# Patient Record
Sex: FEMALE | Race: WHITE | NOT HISPANIC OR LATINO | Employment: OTHER
[De-identification: names, ages, dates, MRNs, and addresses within clinical notes are randomized per-mention and may not be internally consistent; named-entity substitution may affect disease eponyms.]

---

## 2017-12-18 ENCOUNTER — HOSPITAL (OUTPATIENT)
Dept: OTHER | Age: 62
End: 2017-12-18
Attending: EMERGENCY MEDICINE

## 2017-12-18 LAB
ALBUMIN SERPL-MCNC: 3.4 GM/DL (ref 3.6–5.1)
ALBUMIN/GLOB SERPL: 0.6 {RATIO} (ref 1–2.4)
ALP SERPL-CCNC: 228 UNIT/L (ref 45–117)
ALT SERPL-CCNC: 41 UNIT/L
AMORPH SED URNS QL MICRO: ABNORMAL
ANALYZER ANC (IANC): ABNORMAL
ANION GAP SERPL CALC-SCNC: 14 MMOL/L (ref 10–20)
APPEARANCE UR: CLEAR
AST SERPL-CCNC: 33 UNIT/L
BACTERIA #/AREA URNS HPF: ABNORMAL /HPF
BASOPHILS # BLD: 0 THOUSAND/MCL (ref 0–0.3)
BASOPHILS NFR BLD: 0 %
BILIRUB SERPL-MCNC: 0.8 MG/DL (ref 0.2–1)
BILIRUB UR QL: NEGATIVE
BUN SERPL-MCNC: 18 MG/DL (ref 6–20)
BUN/CREAT SERPL: 14 (ref 7–25)
CALCIUM SERPL-MCNC: 8.9 MG/DL (ref 8.4–10.2)
CAOX CRY URNS QL MICRO: ABNORMAL
CHLORIDE: 107 MMOL/L (ref 98–107)
CO2 SERPL-SCNC: 27 MMOL/L (ref 21–32)
COLOR UR: YELLOW
CREAT SERPL-MCNC: 1.32 MG/DL (ref 0.51–0.95)
DIFFERENTIAL METHOD BLD: ABNORMAL
EOSINOPHIL # BLD: 0.2 THOUSAND/MCL (ref 0.1–0.5)
EOSINOPHIL NFR BLD: 2 %
EPITH CASTS #/AREA URNS LPF: ABNORMAL /[LPF]
ERYTHROCYTE [DISTWIDTH] IN BLOOD: 15.4 % (ref 11–15)
FATTY CASTS #/AREA URNS LPF: ABNORMAL /[LPF]
GLOBULIN SER-MCNC: 5.3 GM/DL (ref 2–4)
GLUCOSE SERPL-MCNC: 89 MG/DL (ref 65–99)
GLUCOSE UR-MCNC: NEGATIVE MG/DL
GRAN CASTS #/AREA URNS LPF: ABNORMAL /[LPF]
HEMATOCRIT: 45.6 % (ref 36–46.5)
HGB BLD-MCNC: 14.9 GM/DL (ref 12–15.5)
HGB UR QL: NEGATIVE
HYALINE CASTS #/AREA URNS LPF: ABNORMAL /LPF (ref 0–5)
KETONES UR-MCNC: ABNORMAL MG/DL
LEUKOCYTE ESTERASE UR QL STRIP: NEGATIVE
LIPASE SERPL-CCNC: 70 UNIT/L (ref 73–393)
LYMPHOCYTES # BLD: 3.3 THOUSAND/MCL (ref 1–4)
LYMPHOCYTES NFR BLD: 27 %
MAGNESIUM SERPL-MCNC: 1.9 MG/DL (ref 1.7–2.4)
MCH RBC QN AUTO: 28.5 PG (ref 26–34)
MCHC RBC AUTO-ENTMCNC: 32.7 GM/DL (ref 32–36.5)
MCV RBC AUTO: 87.4 FL (ref 78–100)
MIXED CELL CASTS #/AREA URNS LPF: ABNORMAL /[LPF]
MONOCYTES # BLD: 0.8 THOUSAND/MCL (ref 0.3–0.9)
MONOCYTES NFR BLD: 6 %
MUCOUS THREADS URNS QL MICRO: ABNORMAL
NEUTROPHILS # BLD: 8 THOUSAND/MCL (ref 1.8–7.7)
NEUTROPHILS NFR BLD: 65 %
NEUTS SEG NFR BLD: ABNORMAL %
NITRITE UR QL: NEGATIVE
PERCENT NRBC: ABNORMAL
PH UR: 7 UNIT (ref 5–7)
PLATELET # BLD: 286 THOUSAND/MCL (ref 140–450)
POTASSIUM SERPL-SCNC: 3.2 MMOL/L (ref 3.4–5.1)
PROT SERPL-MCNC: 8.7 GM/DL (ref 6.4–8.2)
PROT UR QL: NEGATIVE MG/DL
RBC # BLD: 5.22 MILLION/MCL (ref 4–5.2)
RBC #/AREA URNS HPF: ABNORMAL /HPF (ref 0–3)
RBC CASTS #/AREA URNS LPF: ABNORMAL /[LPF]
RENAL EPI CELLS #/AREA URNS HPF: ABNORMAL /[HPF]
SODIUM SERPL-SCNC: 145 MMOL/L (ref 135–145)
SP GR UR: 1.02 (ref 1–1.03)
SPECIMEN SOURCE: ABNORMAL
SPERM URNS QL MICRO: ABNORMAL
SQUAMOUS #/AREA URNS HPF: ABNORMAL /HPF (ref 0–5)
T VAGINALIS URNS QL MICRO: ABNORMAL
TRI-PHOS CRY URNS QL MICRO: ABNORMAL
URATE CRY URNS QL MICRO: ABNORMAL
URINE REFLEX: ABNORMAL
URNS CMNT MICRO: ABNORMAL
UROBILINOGEN UR QL: 2 MG/DL (ref 0–1)
WAXY CASTS #/AREA URNS LPF: ABNORMAL /[LPF]
WBC # BLD: 12.3 THOUSAND/MCL (ref 4.2–11)
WBC #/AREA URNS HPF: ABNORMAL /HPF (ref 0–5)
WBC CASTS #/AREA URNS LPF: ABNORMAL /[LPF]
YEAST HYPHAE URNS QL MICRO: ABNORMAL
YEAST URNS QL MICRO: ABNORMAL

## 2018-02-12 ENCOUNTER — HOSPITAL (OUTPATIENT)
Dept: OTHER | Age: 63
End: 2018-02-12
Attending: SPECIALIST

## 2018-02-15 ENCOUNTER — HOSPITAL (OUTPATIENT)
Dept: OTHER | Age: 63
End: 2018-02-15
Attending: SPECIALIST

## 2018-03-29 ENCOUNTER — HOSPITAL (OUTPATIENT)
Dept: OTHER | Age: 63
End: 2018-03-29
Attending: SPECIALIST

## 2018-03-29 ENCOUNTER — DIAGNOSTIC TRANS (OUTPATIENT)
Dept: OTHER | Age: 63
End: 2018-03-29

## 2018-03-29 LAB
ANALYZER ANC (IANC): ABNORMAL
ANION GAP SERPL CALC-SCNC: 12 MMOL/L (ref 10–20)
APTT PPP: 26 SECONDS (ref 22–30)
APTT PPP: 35 SECONDS (ref 22–30)
APTT PPP: ABNORMAL S
APTT PPP: NORMAL S
BASOPHILS # BLD: 0 THOUSAND/MCL (ref 0–0.3)
BASOPHILS NFR BLD: 0 %
BNP SERPL-MCNC: 22 PG/ML
BUN SERPL-MCNC: 36 MG/DL (ref 6–20)
BUN/CREAT SERPL: 24 (ref 7–25)
CALCIUM SERPL-MCNC: 9.1 MG/DL (ref 8.4–10.2)
CHLORIDE: 103 MMOL/L (ref 98–107)
CO2 SERPL-SCNC: 31 MMOL/L (ref 21–32)
CREAT SERPL-MCNC: 1.5 MG/DL (ref 0.51–0.95)
DIFFERENTIAL METHOD BLD: ABNORMAL
EOSINOPHIL # BLD: 0.5 THOUSAND/MCL (ref 0.1–0.5)
EOSINOPHIL NFR BLD: 4 %
ERYTHROCYTE [DISTWIDTH] IN BLOOD: 16.6 % (ref 11–15)
FREE T4: 1.2 NG/DL (ref 0.8–1.5)
GLUCOSE BLDC GLUCOMTR-MCNC: 180 MG/DL (ref 65–99)
GLUCOSE BLDC GLUCOMTR-MCNC: 185 MG/DL (ref 65–99)
GLUCOSE BLDC GLUCOMTR-MCNC: 248 MG/DL (ref 65–99)
GLUCOSE BLDC GLUCOMTR-MCNC: 55 MG/DL (ref 65–99)
GLUCOSE SERPL-MCNC: 93 MG/DL (ref 65–99)
HEMATOCRIT: 49.6 % (ref 36–46.5)
HGB BLD-MCNC: 16.4 GM/DL (ref 12–15.5)
INR PPP: 1
LYMPHOCYTES # BLD: 3.1 THOUSAND/MCL (ref 1–4)
LYMPHOCYTES NFR BLD: 25 %
MAGNESIUM SERPL-MCNC: 2 MG/DL (ref 1.7–2.4)
MCH RBC QN AUTO: 28.8 PG (ref 26–34)
MCHC RBC AUTO-ENTMCNC: 33.1 GM/DL (ref 32–36.5)
MCV RBC AUTO: 87 FL (ref 78–100)
MONOCYTES # BLD: 0.9 THOUSAND/MCL (ref 0.3–0.9)
MONOCYTES NFR BLD: 8 %
NEUTROPHILS # BLD: 7.7 THOUSAND/MCL (ref 1.8–7.7)
NEUTROPHILS NFR BLD: 63 %
NEUTS SEG NFR BLD: ABNORMAL %
PERCENT NRBC: ABNORMAL
PLATELET # BLD: 299 THOUSAND/MCL (ref 140–450)
POTASSIUM SERPL-SCNC: 3.5 MMOL/L (ref 3.4–5.1)
PROTHROMBIN TIME: 10.3 SECONDS (ref 9.7–11.8)
PROTHROMBIN TIME: NORMAL
RBC # BLD: 5.7 MILLION/MCL (ref 4–5.2)
SODIUM SERPL-SCNC: 142 MMOL/L (ref 135–145)
TROPONIN I SERPL HS-MCNC: <0.02 NG/ML
TSH SERPL-ACNC: 7.75 MCUNIT/ML (ref 0.35–5)
WBC # BLD: 12.2 THOUSAND/MCL (ref 4.2–11)

## 2018-03-30 ENCOUNTER — PRIOR ORIGINAL RECORDS (OUTPATIENT)
Dept: OTHER | Age: 63
End: 2018-03-30

## 2018-03-30 ENCOUNTER — CHARTING TRANS (OUTPATIENT)
Dept: OTHER | Age: 63
End: 2018-03-30

## 2018-03-30 LAB
ALBUMIN SERPL-MCNC: 3 GM/DL (ref 3.6–5.1)
ALBUMIN/GLOB SERPL: 0.6 {RATIO} (ref 1–2.4)
ALP SERPL-CCNC: 175 UNIT/L (ref 45–117)
ALT SERPL-CCNC: 40 UNIT/L
ANALYZER ANC (IANC): ABNORMAL
ANION GAP SERPL CALC-SCNC: 11 MMOL/L (ref 10–20)
APTT PPP: 37 SECONDS (ref 22–30)
APTT PPP: 48 SECONDS (ref 22–30)
APTT PPP: ABNORMAL S
APTT PPP: ABNORMAL S
AST SERPL-CCNC: 33 UNIT/L
BILIRUB SERPL-MCNC: 0.6 MG/DL (ref 0.2–1)
BUN SERPL-MCNC: 33 MG/DL (ref 6–20)
BUN/CREAT SERPL: 23 (ref 7–25)
CALCIUM SERPL-MCNC: 8.7 MG/DL (ref 8.4–10.2)
CHLORIDE: 104 MMOL/L (ref 98–107)
CHOLEST SERPL-MCNC: 135 MG/DL
CHOLEST/HDLC SERPL: 3.2 {RATIO}
CO2 SERPL-SCNC: 29 MMOL/L (ref 21–32)
CREAT SERPL-MCNC: 1.42 MG/DL (ref 0.51–0.95)
ERYTHROCYTE [DISTWIDTH] IN BLOOD: 16.5 % (ref 11–15)
GLOBULIN SER-MCNC: 5 GM/DL (ref 2–4)
GLUCOSE BLDC GLUCOMTR-MCNC: 145 MG/DL (ref 65–99)
GLUCOSE BLDC GLUCOMTR-MCNC: 198 MG/DL (ref 65–99)
GLUCOSE BLDC GLUCOMTR-MCNC: 92 MG/DL (ref 65–99)
GLUCOSE SERPL-MCNC: 118 MG/DL (ref 65–99)
GLYCOHEMOGLOBIN: 7.9 % (ref 4.5–5.6)
HDLC SERPL-MCNC: 42 MG/DL
HEMATOCRIT: 46.8 % (ref 36–46.5)
HGB BLD-MCNC: 15.1 GM/DL (ref 12–15.5)
LDLC SERPL CALC-MCNC: 74 MG/DL
MCH RBC QN AUTO: 27.9 PG (ref 26–34)
MCHC RBC AUTO-ENTMCNC: 32.3 GM/DL (ref 32–36.5)
MCV RBC AUTO: 86.5 FL (ref 78–100)
NONHDLC SERPL-MCNC: 93 MG/DL
PERCENT NRBC: ABNORMAL
PLATELET # BLD: 258 THOUSAND/MCL (ref 140–450)
POTASSIUM SERPL-SCNC: 3.8 MMOL/L (ref 3.4–5.1)
PROT SERPL-MCNC: 8 GM/DL (ref 6.4–8.2)
RBC # BLD: 5.41 MILLION/MCL (ref 4–5.2)
SODIUM SERPL-SCNC: 140 MMOL/L (ref 135–145)
TRIGLYCERIDE (TRIGP): 95 MG/DL
WBC # BLD: 10.4 THOUSAND/MCL (ref 4.2–11)

## 2018-04-03 ENCOUNTER — CHARTING TRANS (OUTPATIENT)
Dept: OTHER | Age: 63
End: 2018-04-03

## 2018-04-03 ENCOUNTER — PRIOR ORIGINAL RECORDS (OUTPATIENT)
Dept: OTHER | Age: 63
End: 2018-04-03

## 2018-04-03 ENCOUNTER — HOSPITAL (OUTPATIENT)
Dept: OTHER | Age: 63
End: 2018-04-03
Attending: SPECIALIST

## 2018-04-03 LAB
ANALYZER ANC (IANC): ABNORMAL
ANION GAP SERPL CALC-SCNC: 14 MMOL/L (ref 10–20)
BASOPHILS # BLD: 0.1 THOUSAND/MCL (ref 0–0.3)
BASOPHILS NFR BLD: 0 %
BUN SERPL-MCNC: 41 MG/DL (ref 6–20)
BUN/CREAT SERPL: 27 (ref 7–25)
CALCIUM SERPL-MCNC: 9 MG/DL (ref 8.4–10.2)
CHLORIDE: 106 MMOL/L (ref 98–107)
CO2 SERPL-SCNC: 27 MMOL/L (ref 21–32)
CREAT SERPL-MCNC: 1.52 MG/DL (ref 0.51–0.95)
DIFFERENTIAL METHOD BLD: ABNORMAL
EOSINOPHIL # BLD: 0.3 THOUSAND/MCL (ref 0.1–0.5)
EOSINOPHIL NFR BLD: 2 %
ERYTHROCYTE [DISTWIDTH] IN BLOOD: 16.9 % (ref 11–15)
GLUCOSE BLDC GLUCOMTR-MCNC: 68 MG/DL (ref 65–99)
GLUCOSE BLDC GLUCOMTR-MCNC: 69 MG/DL (ref 65–99)
GLUCOSE BLDC GLUCOMTR-MCNC: 76 MG/DL (ref 65–99)
GLUCOSE BLDC GLUCOMTR-MCNC: 83 MG/DL (ref 65–99)
GLUCOSE SERPL-MCNC: 76 MG/DL (ref 65–99)
HEMATOCRIT: 48 % (ref 36–46.5)
HGB BLD-MCNC: 15.7 GM/DL (ref 12–15.5)
LYMPHOCYTES # BLD: 2.3 THOUSAND/MCL (ref 1–4)
LYMPHOCYTES NFR BLD: 17 %
MAGNESIUM SERPL-MCNC: 2.1 MG/DL (ref 1.7–2.4)
MCH RBC QN AUTO: 28.7 PG (ref 26–34)
MCHC RBC AUTO-ENTMCNC: 32.7 GM/DL (ref 32–36.5)
MCV RBC AUTO: 87.8 FL (ref 78–100)
MONOCYTES # BLD: 1.1 THOUSAND/MCL (ref 0.3–0.9)
MONOCYTES NFR BLD: 8 %
NEUTROPHILS # BLD: 9.3 THOUSAND/MCL (ref 1.8–7.7)
NEUTROPHILS NFR BLD: 73 %
NEUTS SEG NFR BLD: ABNORMAL %
PERCENT NRBC: ABNORMAL
PLATELET # BLD: 248 THOUSAND/MCL (ref 140–450)
POTASSIUM SERPL-SCNC: 3.8 MMOL/L (ref 3.4–5.1)
RBC # BLD: 5.47 MILLION/MCL (ref 4–5.2)
SODIUM SERPL-SCNC: 143 MMOL/L (ref 135–145)
WBC # BLD: 13 THOUSAND/MCL (ref 4.2–11)

## 2018-04-04 PROBLEM — R06.83 SNORING: Status: ACTIVE | Noted: 2018-04-04

## 2018-04-04 PROBLEM — I48.91 ATRIAL FIBRILLATION, UNSPECIFIED TYPE (HCC): Status: ACTIVE | Noted: 2018-04-04

## 2018-04-04 LAB
GLUCOSE BLDC GLUCOMTR-MCNC: 118 MG/DL (ref 65–99)
GLUCOSE BLDC GLUCOMTR-MCNC: 120 MG/DL (ref 65–99)
GLUCOSE BLDC GLUCOMTR-MCNC: 85 MG/DL (ref 65–99)

## 2018-04-12 ENCOUNTER — HOSPITAL (OUTPATIENT)
Dept: OTHER | Age: 63
End: 2018-04-12
Attending: SPECIALIST

## 2018-04-30 ENCOUNTER — HOSPITAL (OUTPATIENT)
Dept: OTHER | Age: 63
End: 2018-04-30
Attending: SPECIALIST

## 2018-05-02 ENCOUNTER — HOSPITAL (OUTPATIENT)
Dept: OTHER | Age: 63
End: 2018-05-02
Attending: SPECIALIST

## 2018-06-07 ENCOUNTER — HOSPITAL (OUTPATIENT)
Dept: OTHER | Age: 63
End: 2018-06-07
Attending: SPECIALIST

## 2018-06-13 ENCOUNTER — MYAURORA ACCOUNT LINK (OUTPATIENT)
Dept: OTHER | Age: 63
End: 2018-06-13

## 2018-06-13 ENCOUNTER — PRIOR ORIGINAL RECORDS (OUTPATIENT)
Dept: OTHER | Age: 63
End: 2018-06-13

## 2018-06-13 LAB
BUN: 41 MG/DL
CALCIUM: 9 MG/DL
CHLORIDE: 106 MEQ/L
CHOLESTEROL, TOTAL: 135 MG/DL
CREATININE, SERUM: 1.52 MG/DL
GLUCOSE: 76 MG/DL
HDL CHOLESTEROL: 42 MG/DL
LDL CHOLESTEROL: 74 MG/DL
NON-HDL CHOLESTEROL: 93 MG/DL
POTASSIUM, SERUM: 3.8 MEQ/L
SODIUM: 143 MEQ/L
TRIGLYCERIDES: 95 MG/DL

## 2018-08-07 ENCOUNTER — HOSPITAL (OUTPATIENT)
Dept: OTHER | Age: 63
End: 2018-08-07
Attending: INTERNAL MEDICINE

## 2018-08-10 ENCOUNTER — HOSPITAL (OUTPATIENT)
Dept: OTHER | Age: 63
End: 2018-08-10
Attending: SPECIALIST

## 2018-11-01 ENCOUNTER — HOSPITAL (OUTPATIENT)
Dept: OTHER | Age: 63
End: 2018-11-01
Attending: SPECIALIST

## 2019-02-28 VITALS
WEIGHT: 293 LBS | HEART RATE: 63 BPM | BODY MASS INDEX: 48.82 KG/M2 | HEIGHT: 65 IN | SYSTOLIC BLOOD PRESSURE: 108 MMHG | DIASTOLIC BLOOD PRESSURE: 64 MMHG

## 2019-11-23 ENCOUNTER — HOSPITAL ENCOUNTER (OUTPATIENT)
Dept: GENERAL RADIOLOGY | Facility: HOSPITAL | Age: 64
Discharge: HOME | End: 2019-11-23
Attending: SPECIALIST
Payer: MEDICARE

## 2019-11-23 DIAGNOSIS — M54.16 LUMBAR RADICULOPATHY: ICD-10-CM

## 2019-11-23 PROCEDURE — 72110 X-RAY EXAM L-2 SPINE 4/>VWS: CPT | Performed by: SPECIALIST

## 2019-11-27 ENCOUNTER — HOSPITAL ENCOUNTER (OUTPATIENT)
Dept: BONE DENSITY | Age: 64
Discharge: HOME | End: 2019-11-27
Attending: SPECIALIST
Payer: MEDICARE

## 2019-11-27 ENCOUNTER — HOSPITAL ENCOUNTER (OUTPATIENT)
Dept: MAMMOGRAPHY | Age: 64
Discharge: HOME | End: 2019-11-27
Attending: SPECIALIST
Payer: MEDICARE

## 2019-11-27 DIAGNOSIS — M54.16 LUMBAR RADICULOPATHY: ICD-10-CM

## 2019-11-27 DIAGNOSIS — Z12.31 ENCOUNTER FOR SCREENING MAMMOGRAM FOR MALIGNANT NEOPLASM OF BREAST: ICD-10-CM

## 2019-11-27 DIAGNOSIS — M81.0 OSTEOPOROSIS: ICD-10-CM

## 2019-11-27 PROCEDURE — 77067 SCR MAMMO BI INCL CAD: CPT | Performed by: SPECIALIST

## 2019-11-27 PROCEDURE — 77080 DXA BONE DENSITY AXIAL: CPT | Performed by: SPECIALIST

## 2019-11-27 PROCEDURE — 77063 BREAST TOMOSYNTHESIS BI: CPT | Performed by: SPECIALIST

## 2019-12-12 ENCOUNTER — HOSPITAL ENCOUNTER (INPATIENT)
Facility: HOSPITAL | Age: 64
LOS: 2 days | Discharge: HOME | DRG: 603 | End: 2019-12-15
Attending: EMERGENCY MEDICINE | Admitting: INTERNAL MEDICINE
Payer: MEDICARE

## 2019-12-12 ENCOUNTER — APPOINTMENT (OUTPATIENT)
Dept: GENERAL RADIOLOGY | Facility: HOSPITAL | Age: 64
DRG: 603 | End: 2019-12-12
Attending: EMERGENCY MEDICINE
Payer: MEDICARE

## 2019-12-12 DIAGNOSIS — N28.9 RENAL INSUFFICIENCY: ICD-10-CM

## 2019-12-12 DIAGNOSIS — Z79.4 TYPE 2 DIABETES MELLITUS WITH HYPERGLYCEMIA, WITH LONG-TERM CURRENT USE OF INSULIN (HCC): ICD-10-CM

## 2019-12-12 DIAGNOSIS — L02.612 ABSCESS OR CELLULITIS OF TOE, LEFT: Primary | ICD-10-CM

## 2019-12-12 DIAGNOSIS — E87.6 HYPOKALEMIA: ICD-10-CM

## 2019-12-12 DIAGNOSIS — L03.032 ABSCESS OR CELLULITIS OF TOE, LEFT: Primary | ICD-10-CM

## 2019-12-12 DIAGNOSIS — E11.65 TYPE 2 DIABETES MELLITUS WITH HYPERGLYCEMIA, WITH LONG-TERM CURRENT USE OF INSULIN (HCC): ICD-10-CM

## 2019-12-12 LAB
ALBUMIN SERPL-MCNC: 3 G/DL (ref 3.4–5)
ALBUMIN/GLOB SERPL: 0.6 {RATIO} (ref 1–2)
ALP LIVER SERPL-CCNC: 201 U/L (ref 50–130)
ALT SERPL-CCNC: 52 U/L (ref 13–56)
ANION GAP SERPL CALC-SCNC: 10 MMOL/L (ref 0–18)
AST SERPL-CCNC: 35 U/L (ref 15–37)
BASOPHILS # BLD AUTO: 0.09 X10(3) UL (ref 0–0.2)
BASOPHILS NFR BLD AUTO: 0.8 %
BILIRUB SERPL-MCNC: 0.3 MG/DL (ref 0.1–2)
BILIRUB UR QL STRIP.AUTO: NEGATIVE
BUN BLD-MCNC: 84 MG/DL (ref 7–18)
BUN/CREAT SERPL: 26.8 (ref 10–20)
CALCIUM BLD-MCNC: 7.7 MG/DL (ref 8.5–10.1)
CHLORIDE SERPL-SCNC: 103 MMOL/L (ref 98–112)
CLARITY UR REFRACT.AUTO: CLEAR
CO2 SERPL-SCNC: 25 MMOL/L (ref 21–32)
CREAT BLD-MCNC: 3.13 MG/DL (ref 0.55–1.02)
DEPRECATED RDW RBC AUTO: 51.4 FL (ref 35.1–46.3)
EOSINOPHIL # BLD AUTO: 0.54 X10(3) UL (ref 0–0.7)
EOSINOPHIL NFR BLD AUTO: 5 %
ERYTHROCYTE [DISTWIDTH] IN BLOOD BY AUTOMATED COUNT: 15.9 % (ref 11–15)
GLOBULIN PLAS-MCNC: 5.3 G/DL (ref 2.8–4.4)
GLUCOSE BLD-MCNC: 178 MG/DL (ref 70–99)
GLUCOSE BLD-MCNC: 331 MG/DL (ref 70–99)
GLUCOSE BLD-MCNC: 69 MG/DL (ref 70–99)
GLUCOSE BLD-MCNC: 92 MG/DL (ref 70–99)
GLUCOSE UR STRIP.AUTO-MCNC: 50 MG/DL
HCT VFR BLD AUTO: 41.7 % (ref 35–48)
HGB BLD-MCNC: 13.6 G/DL (ref 12–16)
IMM GRANULOCYTES # BLD AUTO: 0.05 X10(3) UL (ref 0–1)
IMM GRANULOCYTES NFR BLD: 0.5 %
KETONES UR STRIP.AUTO-MCNC: NEGATIVE MG/DL
LEUKOCYTE ESTERASE UR QL STRIP.AUTO: NEGATIVE
LYMPHOCYTES # BLD AUTO: 2.35 X10(3) UL (ref 1–4)
LYMPHOCYTES NFR BLD AUTO: 21.6 %
M PROTEIN MFR SERPL ELPH: 8.3 G/DL (ref 6.4–8.2)
MCH RBC QN AUTO: 28.8 PG (ref 26–34)
MCHC RBC AUTO-ENTMCNC: 32.6 G/DL (ref 31–37)
MCV RBC AUTO: 88.2 FL (ref 80–100)
MONOCYTES # BLD AUTO: 0.79 X10(3) UL (ref 0.1–1)
MONOCYTES NFR BLD AUTO: 7.3 %
NEUTROPHILS # BLD AUTO: 7.05 X10 (3) UL (ref 1.5–7.7)
NEUTROPHILS # BLD AUTO: 7.05 X10(3) UL (ref 1.5–7.7)
NEUTROPHILS NFR BLD AUTO: 64.8 %
NITRITE UR QL STRIP.AUTO: NEGATIVE
OSMOLALITY SERPL CALC.SUM OF ELEC: 324 MOSM/KG (ref 275–295)
PH UR STRIP.AUTO: 6 [PH] (ref 4.5–8)
PLATELET # BLD AUTO: 291 10(3)UL (ref 150–450)
POTASSIUM SERPL-SCNC: 2.8 MMOL/L (ref 3.5–5.1)
PROT UR STRIP.AUTO-MCNC: NEGATIVE MG/DL
RBC # BLD AUTO: 4.73 X10(6)UL (ref 3.8–5.3)
RBC UR QL AUTO: NEGATIVE
SODIUM SERPL-SCNC: 117 MMOL/L
SODIUM SERPL-SCNC: 138 MMOL/L (ref 136–145)
SP GR UR STRIP.AUTO: 1.01 (ref 1–1.03)
UROBILINOGEN UR STRIP.AUTO-MCNC: <2 MG/DL
WBC # BLD AUTO: 10.9 X10(3) UL (ref 4–11)

## 2019-12-12 PROCEDURE — 73660 X-RAY EXAM OF TOE(S): CPT | Performed by: EMERGENCY MEDICINE

## 2019-12-12 RX ORDER — PRAVASTATIN SODIUM 80 MG/1
80 TABLET ORAL NIGHTLY
COMMUNITY

## 2019-12-12 RX ORDER — CEPHALEXIN 500 MG/1
500 CAPSULE ORAL 4 TIMES DAILY
Qty: 40 CAPSULE | Refills: 0 | Status: SHIPPED | OUTPATIENT
Start: 2019-12-12 | End: 2019-12-15

## 2019-12-12 RX ORDER — POTASSIUM CHLORIDE 14.9 MG/ML
20 INJECTION INTRAVENOUS ONCE
Status: COMPLETED | OUTPATIENT
Start: 2019-12-12 | End: 2019-12-12

## 2019-12-12 RX ORDER — INSULIN ASPART 100 [IU]/ML
10 INJECTION, SOLUTION INTRAVENOUS; SUBCUTANEOUS ONCE
Status: COMPLETED | OUTPATIENT
Start: 2019-12-12 | End: 2019-12-12

## 2019-12-12 RX ORDER — POTASSIUM CHLORIDE 20 MEQ/1
20 TABLET, EXTENDED RELEASE ORAL ONCE
Status: COMPLETED | OUTPATIENT
Start: 2019-12-12 | End: 2019-12-12

## 2019-12-12 RX ORDER — CEFAZOLIN SODIUM/WATER 2 G/20 ML
2 SYRINGE (ML) INTRAVENOUS ONCE
Status: COMPLETED | OUTPATIENT
Start: 2019-12-12 | End: 2019-12-12

## 2019-12-12 RX ORDER — SPIRONOLACTONE 25 MG/1
25 TABLET ORAL 2 TIMES DAILY
COMMUNITY

## 2019-12-12 RX ORDER — SODIUM CHLORIDE 9 MG/ML
INJECTION, SOLUTION INTRAVENOUS CONTINUOUS
Status: DISCONTINUED | OUTPATIENT
Start: 2019-12-12 | End: 2019-12-13

## 2019-12-12 RX ORDER — ALLOPURINOL 100 MG/1
100 TABLET ORAL 2 TIMES DAILY
COMMUNITY

## 2019-12-12 RX ORDER — ASPIRIN 81 MG/1
81 TABLET, CHEWABLE ORAL AS NEEDED
COMMUNITY

## 2019-12-13 PROBLEM — E11.65 TYPE 2 DIABETES MELLITUS WITH HYPERGLYCEMIA, WITH LONG-TERM CURRENT USE OF INSULIN (HCC): Status: ACTIVE | Noted: 2019-12-13

## 2019-12-13 PROBLEM — G47.33 OSA ON CPAP: Status: ACTIVE | Noted: 2019-12-13

## 2019-12-13 PROBLEM — Z79.4 TYPE 2 DIABETES MELLITUS WITH HYPERGLYCEMIA, WITH LONG-TERM CURRENT USE OF INSULIN (HCC): Status: ACTIVE | Noted: 2019-12-13

## 2019-12-13 PROBLEM — E87.6 HYPOKALEMIA: Status: ACTIVE | Noted: 2019-12-13

## 2019-12-13 PROBLEM — N28.9 RENAL INSUFFICIENCY: Status: ACTIVE | Noted: 2019-12-13

## 2019-12-13 PROBLEM — Z99.89 OSA ON CPAP: Status: ACTIVE | Noted: 2019-12-13

## 2019-12-13 LAB
ANION GAP SERPL CALC-SCNC: 8 MMOL/L (ref 0–18)
BUN BLD-MCNC: 79 MG/DL (ref 7–18)
BUN/CREAT SERPL: 29.4 (ref 10–20)
CALCIUM BLD-MCNC: 7.5 MG/DL (ref 8.5–10.1)
CHLORIDE SERPL-SCNC: 106 MMOL/L (ref 98–112)
CO2 SERPL-SCNC: 27 MMOL/L (ref 21–32)
CREAT BLD-MCNC: 2.69 MG/DL (ref 0.55–1.02)
DEPRECATED RDW RBC AUTO: 49.8 FL (ref 35.1–46.3)
ERYTHROCYTE [DISTWIDTH] IN BLOOD BY AUTOMATED COUNT: 16.2 % (ref 11–15)
GLUCOSE BLD-MCNC: 165 MG/DL (ref 70–99)
GLUCOSE BLD-MCNC: 219 MG/DL (ref 70–99)
GLUCOSE BLD-MCNC: 234 MG/DL (ref 70–99)
GLUCOSE BLD-MCNC: 369 MG/DL (ref 70–99)
GLUCOSE BLD-MCNC: 73 MG/DL (ref 70–99)
GLUCOSE BLD-MCNC: 80 MG/DL (ref 70–99)
HCT VFR BLD AUTO: 43.4 % (ref 35–48)
HGB BLD-MCNC: 14.2 G/DL (ref 12–16)
MCH RBC QN AUTO: 28.4 PG (ref 26–34)
MCHC RBC AUTO-ENTMCNC: 32.7 G/DL (ref 31–37)
MCV RBC AUTO: 86.8 FL (ref 80–100)
OSMOLALITY SERPL CALC.SUM OF ELEC: 314 MOSM/KG (ref 275–295)
PLATELET # BLD AUTO: 295 10(3)UL (ref 150–450)
POTASSIUM SERPL-SCNC: 2.5 MMOL/L (ref 3.5–5.1)
RBC # BLD AUTO: 5 X10(6)UL (ref 3.8–5.3)
SODIUM SERPL-SCNC: 141 MMOL/L (ref 136–145)
WBC # BLD AUTO: 13.5 X10(3) UL (ref 4–11)

## 2019-12-13 PROCEDURE — 5A09357 ASSISTANCE WITH RESPIRATORY VENTILATION, LESS THAN 24 CONSECUTIVE HOURS, CONTINUOUS POSITIVE AIRWAY PRESSURE: ICD-10-PCS | Performed by: SPECIALIST

## 2019-12-13 PROCEDURE — 0HDNXZZ EXTRACTION OF LEFT FOOT SKIN, EXTERNAL APPROACH: ICD-10-PCS | Performed by: EMERGENCY MEDICINE

## 2019-12-13 PROCEDURE — 99221 1ST HOSP IP/OBS SF/LOW 40: CPT | Performed by: PODIATRIST

## 2019-12-13 PROCEDURE — 99223 1ST HOSP IP/OBS HIGH 75: CPT | Performed by: INTERNAL MEDICINE

## 2019-12-13 RX ORDER — ASPIRIN 81 MG/1
81 TABLET, CHEWABLE ORAL DAILY
Status: DISCONTINUED | OUTPATIENT
Start: 2019-12-13 | End: 2019-12-15

## 2019-12-13 RX ORDER — ALLOPURINOL 100 MG/1
100 TABLET ORAL DAILY
Status: DISCONTINUED | OUTPATIENT
Start: 2019-12-13 | End: 2019-12-15

## 2019-12-13 RX ORDER — HEPARIN SODIUM 5000 [USP'U]/ML
5000 INJECTION, SOLUTION INTRAVENOUS; SUBCUTANEOUS EVERY 12 HOURS
Status: DISCONTINUED | OUTPATIENT
Start: 2019-12-13 | End: 2019-12-13

## 2019-12-13 RX ORDER — ONDANSETRON 2 MG/ML
4 INJECTION INTRAMUSCULAR; INTRAVENOUS EVERY 6 HOURS PRN
Status: DISCONTINUED | OUTPATIENT
Start: 2019-12-13 | End: 2019-12-15

## 2019-12-13 RX ORDER — POTASSIUM CHLORIDE 750 MG/1
10 TABLET, FILM COATED, EXTENDED RELEASE ORAL 2 TIMES DAILY
Status: ON HOLD | COMMUNITY
End: 2019-12-14

## 2019-12-13 RX ORDER — SPIRONOLACTONE 25 MG/1
25 TABLET ORAL DAILY
Status: DISCONTINUED | OUTPATIENT
Start: 2019-12-13 | End: 2019-12-15

## 2019-12-13 RX ORDER — CETIRIZINE HYDROCHLORIDE 10 MG/1
10 TABLET ORAL DAILY
Status: DISCONTINUED | OUTPATIENT
Start: 2019-12-13 | End: 2019-12-13

## 2019-12-13 RX ORDER — ATORVASTATIN CALCIUM 20 MG/1
20 TABLET, FILM COATED ORAL NIGHTLY
Status: DISCONTINUED | OUTPATIENT
Start: 2019-12-13 | End: 2019-12-15

## 2019-12-13 RX ORDER — HEPARIN SODIUM 5000 [USP'U]/ML
7500 INJECTION, SOLUTION INTRAVENOUS; SUBCUTANEOUS 2 TIMES DAILY
Status: DISCONTINUED | OUTPATIENT
Start: 2019-12-13 | End: 2019-12-15

## 2019-12-13 RX ORDER — POTASSIUM CHLORIDE 20 MEQ/1
40 TABLET, EXTENDED RELEASE ORAL ONCE
Status: COMPLETED | OUTPATIENT
Start: 2019-12-13 | End: 2019-12-13

## 2019-12-13 RX ORDER — BUPROPION HYDROCHLORIDE 150 MG/1
150 TABLET, EXTENDED RELEASE ORAL 2 TIMES DAILY
Status: DISCONTINUED | OUTPATIENT
Start: 2019-12-13 | End: 2019-12-15

## 2019-12-13 RX ORDER — BUPROPION HYDROCHLORIDE 300 MG/1
300 TABLET ORAL DAILY
COMMUNITY

## 2019-12-13 RX ORDER — TORSEMIDE 20 MG/1
20 TABLET ORAL DAILY
Status: DISCONTINUED | OUTPATIENT
Start: 2019-12-13 | End: 2019-12-13

## 2019-12-13 RX ORDER — DIPHENHYDRAMINE HYDROCHLORIDE 50 MG/ML
25 INJECTION INTRAMUSCULAR; INTRAVENOUS EVERY 6 HOURS PRN
Status: DISCONTINUED | OUTPATIENT
Start: 2019-12-13 | End: 2019-12-15

## 2019-12-13 RX ORDER — CETIRIZINE HYDROCHLORIDE 5 MG/1
5 TABLET ORAL DAILY
Status: DISCONTINUED | OUTPATIENT
Start: 2019-12-14 | End: 2019-12-15

## 2019-12-13 RX ORDER — CHOLECALCIFEROL (VITAMIN D3) 1250 MCG
1 CAPSULE ORAL
COMMUNITY
End: 2019-12-19

## 2019-12-13 RX ORDER — ACETAMINOPHEN 325 MG/1
650 TABLET ORAL EVERY 6 HOURS PRN
Status: DISCONTINUED | OUTPATIENT
Start: 2019-12-13 | End: 2019-12-15

## 2019-12-13 RX ORDER — HYDRALAZINE HYDROCHLORIDE 20 MG/ML
10 INJECTION INTRAMUSCULAR; INTRAVENOUS EVERY 6 HOURS PRN
Status: DISCONTINUED | OUTPATIENT
Start: 2019-12-13 | End: 2019-12-15

## 2019-12-13 RX ORDER — CARVEDILOL 12.5 MG/1
12.5 TABLET ORAL 2 TIMES DAILY WITH MEALS
Status: DISCONTINUED | OUTPATIENT
Start: 2019-12-13 | End: 2019-12-15

## 2019-12-13 RX ORDER — SODIUM CHLORIDE AND POTASSIUM CHLORIDE .9; .15 G/100ML; G/100ML
SOLUTION INTRAVENOUS CONTINUOUS
Status: DISCONTINUED | OUTPATIENT
Start: 2019-12-13 | End: 2019-12-14

## 2019-12-13 NOTE — PROGRESS NOTES
[facility] Pharmacy Note:  Anticoagulation Weight Dose Adjustment for heparin    FranciscoJ avier Roblero is a 71year old female who has been prescribed heparin 5000 units every 12 hours. CrCl cannot be calculated (Unknown ideal weight.).  Body mass index is 50.36 kg/

## 2019-12-13 NOTE — RESPIRATORY THERAPY NOTE
BERTA : EQUIPMENT USE: DAILY SUMMARY                                            SET MODE: AUTO CPAP WITH CFLEX                                          USAGE IN HOURS:0:18                                          90%

## 2019-12-13 NOTE — PLAN OF CARE
Assumed patient care @ [phone]. Patient a&ox4, c/o of moderate amount of pain in toe, received PO tylenol prn. Creat.: 2.69  K: 2.5, replaced per electrolyte protocol. Prescribed santyl ointment to be applied to toe daily. VSS. Daily weight. QID.   PIV r distraction and/or relaxation techniques    - Anticipate increased pain with activity and pre-medicate as appropriate   Outcome: Progressing

## 2019-12-13 NOTE — PROGRESS NOTES
Pharmacy Note: Renal dose adjustment for Cetirizine (Zyrtec)    Valeriano Mabry has been prescribed Cetirizine (Zyrtec) 10 mg orally daily.    Her estimated creatinine clearance is < 33 mL/min, therefore, the dose has been changed to 5 mg orally daily per P&

## 2019-12-13 NOTE — CONSULTS
[facility]  Report of Consultation    Betty Mabry Patient Status:  Observation    1955 MRN [id number]   [facility] 3NE-A Attending Toya Brito MD   Hosp Day # 0 PCP Frank Pena MD       Assessment / Plan:    1) JUICE- du History:   Diagnosis Date   • Anesthesia complication     slow to be extubated   • Cancer [facility])     uterine   • Depression    • Emphysema    • Heart murmur    • HIGH BLOOD PRESSURE    • Hyperlipidemia    • Migraines    • Necrotizing fasciitis (HCC)    • Os Oral, Daily  •  Insulin Aspart Pen (NOVOLOG) 100 UNIT/ML flexpen 1-5 Units, 1-5 Units, Subcutaneous, TID PC  •  diphenhydrAMINE HCl (BENADRYL) injection 25 mg, 25 mg, Intravenous, Q6H PRN  •  hydrALAzine HCl (APRESOLINE) injection 10 mg, 10 mg, Intravenous Component Value Date    WBC 13.5 12/13/2019    HGB 14.2 12/13/2019    HCT 43.4 12/13/2019    .0 12/13/2019    CREATSERUM 2.69 12/13/2019    BUN 79 12/13/2019     12/13/2019    K 2.5 12/13/2019     12/13/2019    CO2 27.0 12/13/2019    G

## 2019-12-13 NOTE — PLAN OF CARE
NURSING ADMISSION NOTE      Patient admitted via Cart  Oriented to room. Safety precautions initiated. Bed in low position. Call light in reach. Received patient from ER with chief complaints of left big toe blister that pop out.  Culture was done assistance  - Assess pain using appropriate pain scale  - Administer analgesics based on type and severity of pain and evaluate response  - Implement non-pharmacological measures as appropriate and evaluate response  - Consider cultural and social influenc

## 2019-12-13 NOTE — ED PROVIDER NOTES
Patient Seen in: [facility] Emergency Department      History   Patient presents with:  Cellulitis    Stated Complaint: left foot cellulitis    HPI    Patient has a history of type 2 diabetes.   On Tuesday getting out of the shower she noticed a blis Smokeless tobacco: Never Used    Alcohol use: Yes      Comment: rare    Drug use: No             Review of Systems    Positive for stated complaint: left foot cellulitis  Other systems are as noted in HPI. Constitutional and vital signs reviewed.       All 3.0 (*)     Globulin  5.3 (*)     A/G Ratio 0.6 (*)     All other components within normal limits   POCT GLUCOSE - Abnormal; Notable for the following components:    POC Glucose 178 (*)     All other components within normal limits   CBC W/ DIFFERENTIAL - nephrologist.  Last creatinine in the computer is from 2016 and was  1.46.   In October, patient has a record of her creatinine being 1.7    X-ray foot  CONCLUSION:  Soft tissue irregularity and swelling involving the medial aspect of the great toe consiste

## 2019-12-13 NOTE — CONSULTS
[facility]  Report of Consultation    Lyons Evaristo Mabry Patient Status:  Observation    1955 MRN [id number]   [facility] 3NE-A Attending Kimberly Soares MD   Hosp Day # 0 PCP Silver Gregorio MD     SUBJECTIVE: Patient had redness an file.    Allergies:  No Known Allergies    Review of Systems:  See H&P    OBJECTIVE:    Blood pressure 119/58, pulse 71, temperature 98.1 °F (36.7 °C), temperature source Oral, resp.  rate 16, height 5' 6\" (1.676 m), weight (!) 312 lb (141.5 kg), SpO2 98 % the left hallux with epidermal lysis and some superficial necrosis medial plantar aspect of the left hallux. No evidence of any obvious abscess.   No need for surgery at this time will order Santyl to be applied on a daily basis continue with topical appli

## 2019-12-13 NOTE — OCCUPATIONAL THERAPY NOTE
OCCUPATIONAL THERAPY QUICK EVALUATION - INPATIENT    Room Number: 6336/5907-A  Evaluation Date: 12/13/2019     Type of Evaluation: Quick Eval  Presenting Problem: L toe cellulitis    Physician Order: IP Consult to Occupational Therapy  Reason for Therapy: • APPENDECTOMY     • CHOLECYSTECTOMY     • DILATION/CURETTAGE,DIAGNOSTIC     • HYSTERECTOMY     • OTHER      anal surgery for necrotizing fasciitis   • REMOVAL GALLBLADDER     • ROBOT-ASSISTED LAPAROSCOPIC HYSTERECTOMY Bilateral 2/2/2016    Performed by ASSESSMENT  Supine to Sit : Not tested  Sit to Stand: Independent    Skilled Therapy Provided: Independent sit to stand and to ambulate in hallway. Pt was already wearing post-op shoe on L foot. Pt told OT that pt has increased difficulty with LE dressing able to toilet transfer: safely and independently  Patient able to dress lower extremities: safely and independently  Patient/Caregiver able to demonstrate safety with ADLS: safely and independently

## 2019-12-13 NOTE — CONSULTS
INFECTIOUS DISEASE CONSULTATION    Thelma Mabry Patient Status:  Observation    1955 MRN [id number]   [facility] 3NE-A Attending Makenna Morel MD   Hosp Day # 0 JAY Pena that she does not use drugs.     Allergies:  No Known Allergies    Medications:    Current Facility-Administered Medications:   •  cetirizine (ZYRTEC) tab 10 mg, 10 mg, Oral, Daily  •  buPROPion HCl ER (SR) (WELLBUTRIN SR) 12 hr tab 150 mg, 150 mg, Oral, BI Disp: , Rfl:   Pravastatin Sodium 80 MG Oral Tab, Take 80 mg by mouth nightly., Disp: , Rfl:   linaCLOtide 145 MCG Oral Cap, Take by mouth., Disp: , Rfl:   allopurinol 100 MG Oral Tab, Take 100 mg by mouth 2 (two) times daily.   , Disp: , Rfl:   spironolact oozing from toe, faint erythema, appears superficial wound, scab on plantar aspect toe      Laboratory Data:      Recent Labs   Lab 12/12/19  1855 12/13/19  0700   RBC 4.73 5.00   HGB 13.6 14.2   HCT 41.7 43.4   MCV 88.2 86.8   MCH 28.8 28.4   MCHC 32.6 32

## 2019-12-13 NOTE — PHYSICAL THERAPY NOTE
PHYSICAL THERAPY QUICK EVALUATION - INPATIENT    Room Number: 3313/6532-A  Evaluation Date: 12/13/2019  Presenting Problem: L toe cellulitis  Physician Order: PT Eval and Treat    Problem List  Principal Problem:    Abscess or cellulitis of toe, left  Ac (Comment)(post op shoe L foot)  Fall Risk: Standard fall risk    WEIGHT BEARING RESTRICTION  Weight Bearing Restriction: None                PAIN ASSESSMENT  Rating: Unable to rate  Location: L foot  Management Techniques: Activity promotion; Body mechanics toe cellulitis. Pertinent comorbidities and personal factors impacting therapy include DM, JUICE, HTN. Functional outcome measures completed include AMPAC.   Based on this evaluation, patient's clinical presentation is stable and overall evaluation complexi

## 2019-12-14 PROBLEM — E66.01 MORBID OBESITY (HCC): Status: ACTIVE | Noted: 2019-12-14

## 2019-12-14 LAB
ANION GAP SERPL CALC-SCNC: 4 MMOL/L (ref 0–18)
ANION GAP SERPL CALC-SCNC: 9 MMOL/L (ref 0–18)
BUN BLD-MCNC: 64 MG/DL (ref 7–18)
BUN BLD-MCNC: 7 MG/DL (ref 7–18)
BUN/CREAT SERPL: 28.3 (ref 10–20)
BUN/CREAT SERPL: 8 (ref 10–20)
CALCIUM BLD-MCNC: 7.7 MG/DL (ref 8.5–10.1)
CALCIUM BLD-MCNC: 8.1 MG/DL (ref 8.5–10.1)
CHLORIDE SERPL-SCNC: 103 MMOL/L (ref 98–112)
CHLORIDE SERPL-SCNC: 106 MMOL/L (ref 98–112)
CO2 SERPL-SCNC: 25 MMOL/L (ref 21–32)
CO2 SERPL-SCNC: 26 MMOL/L (ref 21–32)
CREAT BLD-MCNC: 0.88 MG/DL (ref 0.55–1.02)
CREAT BLD-MCNC: 2.26 MG/DL (ref 0.55–1.02)
GLUCOSE BLD-MCNC: 102 MG/DL (ref 70–99)
GLUCOSE BLD-MCNC: 109 MG/DL (ref 70–99)
GLUCOSE BLD-MCNC: 296 MG/DL (ref 70–99)
GLUCOSE BLD-MCNC: 300 MG/DL (ref 70–99)
GLUCOSE BLD-MCNC: 309 MG/DL (ref 70–99)
GLUCOSE BLD-MCNC: 355 MG/DL (ref 70–99)
GLUCOSE BLD-MCNC: 375 MG/DL (ref 70–99)
OSMOLALITY SERPL CALC.SUM OF ELEC: 281 MOSM/KG (ref 275–295)
OSMOLALITY SERPL CALC.SUM OF ELEC: 318 MOSM/KG (ref 275–295)
POTASSIUM SERPL-SCNC: 3.1 MMOL/L (ref 3.5–5.1)
POTASSIUM SERPL-SCNC: 4 MMOL/L (ref 3.5–5.1)
SODIUM SERPL-SCNC: 136 MMOL/L (ref 136–145)
SODIUM SERPL-SCNC: 137 MMOL/L (ref 136–145)

## 2019-12-14 PROCEDURE — 99232 SBSQ HOSP IP/OBS MODERATE 35: CPT | Performed by: INTERNAL MEDICINE

## 2019-12-14 RX ORDER — CEFAZOLIN SODIUM/WATER 2 G/20 ML
2 SYRINGE (ML) INTRAVENOUS EVERY 8 HOURS
Status: DISCONTINUED | OUTPATIENT
Start: 2019-12-14 | End: 2019-12-15

## 2019-12-14 RX ORDER — DEXTROSE MONOHYDRATE 25 G/50ML
50 INJECTION, SOLUTION INTRAVENOUS
Status: DISCONTINUED | OUTPATIENT
Start: 2019-12-14 | End: 2019-12-15

## 2019-12-14 RX ORDER — POTASSIUM CHLORIDE 20 MEQ/1
40 TABLET, EXTENDED RELEASE ORAL EVERY 4 HOURS
Status: COMPLETED | OUTPATIENT
Start: 2019-12-14 | End: 2019-12-14

## 2019-12-14 NOTE — H&P
[facility]    PATIENT'S NAME: Bonifacio SHORT   ATTENDING PHYSICIAN: Lisa Fairbanks M.D.    PATIENT ACCOUNT#:   [de-identified]    LOCATION:  [address] [place] [address]  MEDICAL RECORD #:   [id number]       YOB: 1955  ADMISSION DATE:       12/12/2019 Obstructive sleep apnea. 9.   Hypertension. 10.   History of atrial fibrillation, currently normal sinus rhythm. PLAN:  IV antibiotic, IV fluids. Consult Podiatry, Renal, and ID. Sliding scale insulin. We will follow.     Dictated By Christiano Haq

## 2019-12-14 NOTE — PROGRESS NOTES
[facility]                INFECTIOUS DISEASE PROGRESS NOTE    Pooja Mabry Patient Status:  Inpatient    1955 MRN [id number]   [facility] 3NE-A Attending Sylvester Manriquez MD   Hosp Day # 1 PCP MD Flor Low Result Value Ref Range    Aerobic Smear 2+ WBCs seen N/A    Aerobic Smear  N/A     3+ Gram positive cocci in pairs, chains and clusters             Problem list reviewed:  Patient Active Problem List:     Endometrial cancer ([facility] [address]     Snoring     Atrial f

## 2019-12-14 NOTE — PLAN OF CARE
Assumed care at 0730. Pt. A&O x4, on RA, CPAP at night. Tele. IV 0.9 with 20 k at 60. IV ancef Q12. Left toe wound had a dressing change today. Surgical boot when up. No pain reported. Heparin given.  Potassium replaced per protocol, redraw tomorrow AM.  Nathalia Soares Problem: Impaired Functional Mobility  Goal: Achieve highest/safest level of mobility/gait  Description  Interventions:  - Assess patient's functional ability and stability  - Promote increasing activity/tolerance for mobility and gait  - Educate and eng

## 2019-12-14 NOTE — PROGRESS NOTES
[facility]  Progress Note    Franklin Mabry Patient Status:  Observation    1955 MRN [id number]   [facility] 3NE-A Attending [place] Fernando MD   Hosp Day # 1 PCP Cinthia Pardo MD       No complains  Feels better    ceFAZolin s Wt Readings from Last 3 Encounters:  12/14/19 : (!) 312 lb 6.4 oz (141.7 kg)  02/02/16 : (!) 320 lb (145.2 kg)    General: awake alert  HEENT: No scleral icterus, MMM  Neck: Supple, no LYDIA or thyromegaly  Cardiac: Regular rate and rhythm, S1, S2 normal

## 2019-12-14 NOTE — RESPIRATORY THERAPY NOTE
BERTA - Equipment Use Daily Summary:                  . Set Mode: AUTO CPAP WITH C-FLEX                . Usage in hours: 5:54                . 90% Pressure (EPAP) level: 15.5                . 90% Insp. Pressure (IPAP): Trevor Woo  AHI: 1.2

## 2019-12-14 NOTE — PROGRESS NOTES
[facility]                                                            Progress Note    Mariusz Jorge Mabry Patient Status:  Observation    1955 MRN [id number]   [facility] 3NE-A Attending Dimitry Mg MD   Hosp Day # 0 PCP Melony Bates HS    Physical Exam:      General: Alert, orientated x3. Cooperative. No apparent distress. Vital Signs:  Blood pressure 106/62, pulse 65, temperature 98.2 °F (36.8 °C), temperature source Oral, resp.  rate 20, height 5' 6\" (1.676 m), weight (!) 312 lb

## 2019-12-15 ENCOUNTER — APPOINTMENT (OUTPATIENT)
Dept: MRI IMAGING | Facility: HOSPITAL | Age: 64
DRG: 603 | End: 2019-12-15
Attending: PODIATRIST
Payer: MEDICARE

## 2019-12-15 VITALS
WEIGHT: 293 LBS | TEMPERATURE: 98 F | DIASTOLIC BLOOD PRESSURE: 70 MMHG | HEIGHT: 66 IN | OXYGEN SATURATION: 95 % | SYSTOLIC BLOOD PRESSURE: 124 MMHG | HEART RATE: 72 BPM | RESPIRATION RATE: 18 BRPM | BODY MASS INDEX: 47.09 KG/M2

## 2019-12-15 LAB
ANION GAP SERPL CALC-SCNC: 8 MMOL/L (ref 0–18)
BUN BLD-MCNC: 55 MG/DL (ref 7–18)
BUN/CREAT SERPL: 27.6 (ref 10–20)
CALCIUM BLD-MCNC: 8.1 MG/DL (ref 8.5–10.1)
CHLORIDE SERPL-SCNC: 104 MMOL/L (ref 98–112)
CO2 SERPL-SCNC: 26 MMOL/L (ref 21–32)
CREAT BLD-MCNC: 1.99 MG/DL (ref 0.55–1.02)
GLUCOSE BLD-MCNC: 154 MG/DL (ref 70–99)
GLUCOSE BLD-MCNC: 188 MG/DL (ref 70–99)
GLUCOSE BLD-MCNC: 191 MG/DL (ref 70–99)
GLUCOSE BLD-MCNC: 211 MG/DL (ref 70–99)
GLUCOSE BLD-MCNC: 219 MG/DL (ref 70–99)
GLUCOSE BLD-MCNC: 290 MG/DL (ref 70–99)
GLUCOSE BLD-MCNC: 362 MG/DL (ref 70–99)
HAV IGM SER QL: 1.9 MG/DL (ref 1.6–2.6)
OSMOLALITY SERPL CALC.SUM OF ELEC: 307 MOSM/KG (ref 275–295)
POTASSIUM SERPL-SCNC: 3.1 MMOL/L (ref 3.5–5.1)
POTASSIUM SERPL-SCNC: 3.1 MMOL/L (ref 3.5–5.1)
SODIUM SERPL-SCNC: 138 MMOL/L (ref 136–145)

## 2019-12-15 PROCEDURE — 99232 SBSQ HOSP IP/OBS MODERATE 35: CPT | Performed by: INTERNAL MEDICINE

## 2019-12-15 PROCEDURE — 73718 MRI LOWER EXTREMITY W/O DYE: CPT | Performed by: PODIATRIST

## 2019-12-15 RX ORDER — LORAZEPAM 2 MG/ML
0.5 INJECTION INTRAMUSCULAR ONCE
Status: DISCONTINUED | OUTPATIENT
Start: 2019-12-15 | End: 2019-12-15

## 2019-12-15 RX ORDER — TORSEMIDE 20 MG/1
20 TABLET ORAL DAILY
Qty: 30 TABLET | Refills: 0 | Status: SHIPPED | OUTPATIENT
Start: 2019-12-15

## 2019-12-15 RX ORDER — CEPHALEXIN 500 MG/1
1000 CAPSULE ORAL 2 TIMES DAILY
Qty: 60 CAPSULE | Refills: 0 | Status: SHIPPED | OUTPATIENT
Start: 2019-12-15 | End: 2019-12-30

## 2019-12-15 RX ORDER — POTASSIUM CHLORIDE 20 MEQ/1
40 TABLET, EXTENDED RELEASE ORAL EVERY 4 HOURS
Status: DISCONTINUED | OUTPATIENT
Start: 2019-12-15 | End: 2019-12-15

## 2019-12-15 RX ORDER — CEFAZOLIN SODIUM/WATER 2 G/20 ML
2 SYRINGE (ML) INTRAVENOUS EVERY 12 HOURS
Status: DISCONTINUED | OUTPATIENT
Start: 2019-12-15 | End: 2019-12-15

## 2019-12-15 NOTE — PROGRESS NOTES
Patient  prior to dinner. Patient asymptomatic. PCP notified. Order received to increase correction scale Novolog insulin. 20 U Novolog insulin given with dinner per sliding scale orders. Will monitor.

## 2019-12-15 NOTE — PROGRESS NOTES
[facility]                                                            Progress Note    Madai Mabry Patient Status:  Inpatient    1955 MRN [id number]   [facility] 3NE-A Attending Ruel Horton MD   [facility] Day # 2 PCP Toshia Montalvo HCl (ZOFRAN) injection 4 mg, 4 mg, Intravenous, Q6H PRN  •  acetaminophen (TYLENOL) tab 650 mg, 650 mg, Oral, Q6H PRN  •  Cetirizine HCl (ZYRTEC) 5 MG tab 5 mg, 5 mg, Oral, Daily  •  Heparin Sodium (Porcine) 5000 UNIT/ML injection 7,500 Units, 7,500 Units,

## 2019-12-15 NOTE — RESPIRATORY THERAPY NOTE
BERTA - Equipment Use Daily Summary:                  . Set Mode:AUTO CPAP WITH C-FLEX                . Usage in hours:4:54                . 90% Pressure (EPAP) level:15                . 90% Insp. Pressure (IPAP): Jeannine Lopes AHI:2.9                .  Sup

## 2019-12-15 NOTE — PROGRESS NOTES
[facility]  Progress Note    Franklin Mabry Patient Status:  Inpatient    1955 MRN [id number]   [facility] 3NE-A Attending [place] Fernando MD   [facility] Day # 1 PCP Cinthia Pardo MD         SUBJECTIVE:  Subjective:  Franklin Mabry AST 35   ALB 3.0*       Recent Labs   Lab 12/13/19  2327 12/14/19  0708 12/14/19  1138 12/14/19  1716 12/14/19  1811   PGLU 165* 102* 296* 355* 309*                   Meds:     • ceFAZolin  2 g Intravenous Q8H   • Potassium Chloride ER  40 mEq Oral Q4H

## 2019-12-15 NOTE — PLAN OF CARE
Pt. A&Ox4  VSS  IV ancef Q8  PIV SL  QID accucheck  Daily Weight  Surgical boot while up  Tylenol requested for headache and toe pain  Staff will continue to monitor    0115- pt. Has 50 units of levemir ordered.  BG was 188; pt not comfortable with receivin management  - Manage/alleviate anxiety  - Utilize distraction and/or relaxation techniques    - Anticipate increased pain with activity and pre-medicate as appropriate   Outcome: Progressing     Problem: Impaired Functional Mobility  Goal: Achieve highest/

## 2019-12-15 NOTE — PROGRESS NOTES
[facility]  Progress Note    Carlotta Mabry Patient Status:  Observation    1955 MRN [id number]   [facility] 3NE-A Attending Albania Barbosa MD   Hosp Day # 2 PCP Jayshree Candelaria MD       No complains  Feels better    LORazepam ( Exam:  /62 (BP Location: Radial)   Pulse 68   Temp 98.4 °F (36.9 °C) (Oral)   Resp 16   Ht 66\"   Wt (!) 314 lb 11.2 oz (142.7 kg)   SpO2 95%   BMI 50.79 kg/m²   Temp (24hrs), Av °F (36.7 °C), Min:97.9 °F (36.6 °C), Max:98.1 °F (36.7 °C)       In Longstanding type 2 DM > 15 yrs     5.  HTN / morbid obesity / hyperlipidemia      Sindy Rose  12/15/2019

## 2019-12-15 NOTE — PROGRESS NOTES
[facility] Progress Note        Silvia Dearth Patient Status:  Inpatient    1955 MRN [id number]   [facility] 3NE-A Attending Radha Zaman MD   Hosp Day # 2 PCP Bryson Gosselin, MD     Subjective:  Patient left for MRI a few m 3.13* 2.69* 0.88 2.26* 1.99*   CA 7.7* 7.5* 8.1* 7.7* 8.1*   MG  --   --   --   --  1.9   * 73 109* 375* 211*       Recent Labs   Lab 12/12/19  1855   ALT 52   AST 35   ALB 3.0*       No results for input(s): PT, INR in the last 168 hours.     No res

## 2019-12-15 NOTE — PROGRESS NOTES
[facility]                INFECTIOUS DISEASE PROGRESS NOTE    Aman Lamont Mabry Patient Status:  Inpatient    1955 MRN [id number]   [facility] 3NE-A Attending Oksana Martinez MD   Hosp Day # 2 PCP MD Santos Shaffer 12/13/19  9:21 AM   Result Value Ref Range    Blood Culture Result No Growth 2 Days N/A   2.  AEROBIC BACTERIAL CULTURE     Status: Abnormal    Collection Time: 12/12/19  7:37 PM   Result Value Ref Range    Aerobic Culture Result 3+ growth Staphylococcus au

## 2019-12-16 ENCOUNTER — TELEPHONE (OUTPATIENT)
Dept: NEPHROLOGY | Facility: CLINIC | Age: 64
End: 2019-12-16

## 2019-12-16 NOTE — PLAN OF CARE
NSR on tele, VSS, afebrile. K replaced today per protocol. BG monitored prior to meals, values elevated. insulin given per sliding scale orders (patient takes higher daily doses of insulin at home). Left great toe wound dressing changed.   Site is intac cultural and social influences on pain and pain management  - Manage/alleviate anxiety  - Utilize distraction and/or relaxation techniques    - Anticipate increased pain with activity and pre-medicate as appropriate   Outcome: Adequate for Discharge     Pr

## 2019-12-16 NOTE — TELEPHONE ENCOUNTER
Pt said hosp d/c instructions said to stop taking potassium. She said her potassium low, so she wants to confirm whether or not she should take the potassium? She will wait to hear from you before she takes it today.  p

## 2019-12-19 ENCOUNTER — OFFICE VISIT (OUTPATIENT)
Dept: PODIATRY CLINIC | Facility: CLINIC | Age: 64
End: 2019-12-19
Payer: MEDICARE

## 2019-12-19 DIAGNOSIS — L03.031 CELLULITIS OF TOE OF RIGHT FOOT: Primary | ICD-10-CM

## 2019-12-19 DIAGNOSIS — I73.9 PVD (PERIPHERAL VASCULAR DISEASE) (HCC): ICD-10-CM

## 2019-12-19 PROCEDURE — 99213 OFFICE O/P EST LOW 20 MIN: CPT | Performed by: PODIATRIST

## 2019-12-19 RX ORDER — POTASSIUM CHLORIDE 750 MG/1
10 TABLET, EXTENDED RELEASE ORAL 3 TIMES DAILY
COMMUNITY

## 2019-12-19 RX ORDER — ERGOCALCIFEROL 1.25 MG/1
1 CAPSULE ORAL WEEKLY
Refills: 1 | COMMUNITY
Start: 2019-10-31

## 2019-12-19 RX ORDER — AMOXICILLIN AND CLAVULANATE POTASSIUM 875; 125 MG/1; MG/1
1 TABLET, FILM COATED ORAL 2 TIMES DAILY
Qty: 20 TABLET | Refills: 0 | Status: SHIPPED | OUTPATIENT
Start: 2019-12-19 | End: 2020-12-22

## 2019-12-19 NOTE — PROGRESS NOTES
Mehreen Day is a 71year old female. Patient presents with: Follow - Up: Patient was seen in the hospital, left foot hallux blister. FBS was 198 this am. 5/10 for discomfort, patient denies any fever or chills.  Toe is red, swollen and warm to the touch Tab Chew 81 mg by mouth as needed. Patient states 1-2x monthly      • linaCLOtide 145 MCG Oral Cap Take by mouth. • allopurinol 100 MG Oral Tab Take 100 mg by mouth 2 (two) times daily.        • spironolactone 25 MG Oral Tab Take 25 mg by mouth 2 (two) file    Tobacco Use      Smoking status: Former Smoker        Packs/day: 1.00        Years: 35.00        Pack years: 28        Quit date: 10/28/2011        Years since quittin.1      Smokeless tobacco: Never Used    Substance and Sexual Activity      A operation and I gave the patient his number and information to call and set up for an appointment to get this evaluated I will also have our office follow-up and try to make sure she gets in as soon as possible we will I do think what initially was a cellu

## 2019-12-31 NOTE — CDS QUERY
Clarification of Procedure – Debridement                      CLINICAL DOCUMENTATION CLARIFICATION FORM  Dear Doctor  Clinical information (provided below) indicates a debridement was done.  For accurate ICD-10-PCS code assignment to reflect severity of ill

## 2020-12-22 ENCOUNTER — OFFICE VISIT (OUTPATIENT)
Dept: PODIATRY CLINIC | Facility: CLINIC | Age: 65
End: 2020-12-22
Payer: MEDICARE

## 2020-12-22 DIAGNOSIS — L60.0 INGROWN NAIL: ICD-10-CM

## 2020-12-22 DIAGNOSIS — M79.671 RIGHT FOOT PAIN: ICD-10-CM

## 2020-12-22 DIAGNOSIS — S92.301D MULTIPLE CLOSED FRACTURES OF METATARSAL BONE OF RIGHT FOOT WITH ROUTINE HEALING, SUBSEQUENT ENCOUNTER: Primary | ICD-10-CM

## 2020-12-22 DIAGNOSIS — L60.0 INGROWN TOENAIL: ICD-10-CM

## 2020-12-22 PROCEDURE — 28470 CLTX METATARSAL FX WO MNP EA: CPT | Performed by: PODIATRIST

## 2020-12-22 PROCEDURE — 73630 X-RAY EXAM OF FOOT: CPT | Performed by: PODIATRIST

## 2020-12-22 PROCEDURE — 11765 WEDGE EXCISION SKN NAIL FOLD: CPT | Performed by: PODIATRIST

## 2020-12-22 RX ORDER — CEFADROXIL 500 MG/1
500 CAPSULE ORAL 2 TIMES DAILY
Qty: 20 CAPSULE | Refills: 0 | Status: SHIPPED | OUTPATIENT
Start: 2020-12-22

## 2020-12-22 NOTE — PROGRESS NOTES
Yinka Kirby is a 76year old female. Patient presents with:  Diabetic Foot Care: BS this  - possible ingrown nail - pain scale 5/10. Foot Pain: pain on toes right foot.         HPI:     Patient presents today for follow-up evaluation having pain i Medical History:   Diagnosis Date   • Anesthesia complication     slow to be extubated   • Cancer [facility])     uterine   • Depression    • Emphysema    • Heart murmur    • HIGH BLOOD PRESSURE    • Hyperlipidemia    • Migraines    • Necrotizing fasciitis ([facility] [address] arthritis, back pain      EXAM:   There were no vitals taken for this visit. Physical Exam  GENERAL: well developed, well nourished, in no apparent distress  EXTREMITIES:   1. Integument: Normal skin temperature and turgor  2.  Vascular: Dorsalis pedis two

## 2020-12-23 ENCOUNTER — TELEPHONE (OUTPATIENT)
Dept: PODIATRY CLINIC | Facility: CLINIC | Age: 65
End: 2020-12-23

## 2020-12-24 RX ORDER — ACETAMINOPHEN AND CODEINE PHOSPHATE 300; 30 MG/1; MG/1
1-2 TABLET ORAL EVERY 6 HOURS PRN
Qty: 40 TABLET | Refills: 0 | Status: SHIPPED | OUTPATIENT
Start: 2020-12-24 | End: 2020-12-29

## 2020-12-24 NOTE — TELEPHONE ENCOUNTER
Spoke to pt and she is requesting pain medication for her right foot fx 2nd and 3rd metatarsals. Rates pain 6/10. Taking two ES Tylenol for pain \"every couple of hours. \" Pt is icing and elevating foot but states she has been on her feet a lot due to the

## 2020-12-24 NOTE — TELEPHONE ENCOUNTER
Spoke to pt and informed her that Esau Howard Rx'd T#3. Instructed pt to not take Tylenol with the T#3 and to follow label directions. Advised pt to eat something with taking T#3 and to drink plenty of fluids to prevent stomach upset and constipation.  Advised

## 2021-02-01 DIAGNOSIS — Z23 NEED FOR VACCINATION: ICD-10-CM

## 2021-02-06 ENCOUNTER — IMMUNIZATION (OUTPATIENT)
Dept: LAB | Age: 66
End: 2021-02-06
Attending: HOSPITALIST
Payer: MEDICARE

## 2021-02-06 DIAGNOSIS — Z23 NEED FOR VACCINATION: Primary | ICD-10-CM

## 2021-02-06 PROCEDURE — 0001A SARSCOV2 VAC 30MCG/0.3ML IM: CPT

## 2021-02-27 ENCOUNTER — IMMUNIZATION (OUTPATIENT)
Dept: LAB | Age: 66
End: 2021-02-27
Attending: HOSPITALIST
Payer: MEDICARE

## 2021-02-27 DIAGNOSIS — Z23 NEED FOR VACCINATION: Primary | ICD-10-CM

## 2021-02-27 PROCEDURE — 0002A SARSCOV2 VAC 30MCG/0.3ML IM: CPT

## 2021-04-22 ENCOUNTER — HOSPITAL ENCOUNTER (EMERGENCY)
Facility: HOSPITAL | Age: 66
Discharge: HOME | End: 2021-04-22
Attending: EMERGENCY MEDICINE
Payer: MEDICARE

## 2021-04-22 ENCOUNTER — APPOINTMENT (OUTPATIENT)
Dept: GENERAL RADIOLOGY | Facility: HOSPITAL | Age: 66
End: 2021-04-22
Attending: EMERGENCY MEDICINE
Payer: MEDICARE

## 2021-04-22 VITALS
TEMPERATURE: 99 F | HEIGHT: 66 IN | WEIGHT: 293 LBS | OXYGEN SATURATION: 97 % | SYSTOLIC BLOOD PRESSURE: 168 MMHG | HEART RATE: 75 BPM | DIASTOLIC BLOOD PRESSURE: 95 MMHG | BODY MASS INDEX: 47.09 KG/M2 | RESPIRATION RATE: 22 BRPM

## 2021-04-22 DIAGNOSIS — S20.212A CONTUSION OF RIB ON LEFT SIDE, INITIAL ENCOUNTER: ICD-10-CM

## 2021-04-22 DIAGNOSIS — S62.101A CLOSED FRACTURE OF RIGHT WRIST, INITIAL ENCOUNTER: Primary | ICD-10-CM

## 2021-04-22 PROCEDURE — 99284 EMERGENCY DEPT VISIT MOD MDM: CPT | Performed by: EMERGENCY MEDICINE

## 2021-04-22 PROCEDURE — 73110 X-RAY EXAM OF WRIST: CPT | Performed by: EMERGENCY MEDICINE

## 2021-04-22 PROCEDURE — 29125 APPL SHORT ARM SPLINT STATIC: CPT | Performed by: EMERGENCY MEDICINE

## 2021-04-22 PROCEDURE — 71101 X-RAY EXAM UNILAT RIBS/CHEST: CPT | Performed by: EMERGENCY MEDICINE

## 2021-04-22 RX ORDER — HYDROCODONE BITARTRATE AND ACETAMINOPHEN 5; 325 MG/1; MG/1
1-2 TABLET ORAL EVERY 6 HOURS PRN
Qty: 10 TABLET | Refills: 0 | Status: SHIPPED | OUTPATIENT
Start: 2021-04-22 | End: 2021-04-29

## 2021-04-22 NOTE — ED PROVIDER NOTES
Patient Seen in: [facility] Emergency Department      History   Patient presents with:  Fall    Stated Complaint: fall.  Rt wrist injury and left rib pain    HPI/Subjective:   HPI    62-year-old female complaining of left rib pains and right wrist pa injury and left rib pain  Other systems are as noted in HPI. Constitutional and vital signs reviewed. All other systems reviewed and negative except as noted above.     Physical Exam     ED Triage Vitals [04/22/21 1147]   BP (!) 168/95   Pulse 75   Re orthopedic doctor for further evaluation and possible surgical repair. She was given Norco for pain advised Tylenol Advil for milder pain.                              Disposition and Plan     Clinical Impression:  Closed fracture of right wrist, initial e

## 2021-07-06 ENCOUNTER — ORDER TRANSCRIPTION (OUTPATIENT)
Dept: ADMINISTRATIVE | Facility: HOSPITAL | Age: 66
End: 2021-07-06

## 2021-07-06 DIAGNOSIS — E11.9 DM (DIABETES MELLITUS), TYPE 2 (HCC): Primary | ICD-10-CM

## 2021-07-06 DIAGNOSIS — Z13.6 SCREENING FOR HEART DISEASE: ICD-10-CM

## 2021-08-24 ENCOUNTER — APPOINTMENT (OUTPATIENT)
Dept: CT IMAGING | Age: 66
End: 2021-08-24
Attending: SPECIALIST

## 2021-08-24 ENCOUNTER — TELEPHONE (OUTPATIENT)
Dept: SCHEDULING | Age: 66
End: 2021-08-24

## 2021-10-12 ENCOUNTER — HOSPITAL ENCOUNTER (OUTPATIENT)
Dept: CT IMAGING | Age: 66
Discharge: HOME | End: 2021-10-12
Attending: SPECIALIST

## 2021-10-12 DIAGNOSIS — E11.29 TYPE II OR UNSPECIFIED TYPE DIABETES MELLITUS WITH RENAL MANIFESTATIONS, UNCONTROLLED(250.42) (CMD): ICD-10-CM

## 2021-10-12 DIAGNOSIS — E11.65 TYPE II OR UNSPECIFIED TYPE DIABETES MELLITUS WITH RENAL MANIFESTATIONS, UNCONTROLLED(250.42) (CMD): ICD-10-CM

## 2021-10-12 PROCEDURE — 75571 CT HRT W/O DYE W/CA TEST: CPT

## 2021-10-19 ENCOUNTER — ORDER TRANSCRIPTION (OUTPATIENT)
Dept: ADMINISTRATIVE | Facility: HOSPITAL | Age: 66
End: 2021-10-19

## 2021-10-19 DIAGNOSIS — R60.9 EDEMA: ICD-10-CM

## 2021-10-19 DIAGNOSIS — I25.10 CAD (CORONARY ARTERY DISEASE): Primary | ICD-10-CM

## 2021-10-19 DIAGNOSIS — R06.00 DYSPNEA: ICD-10-CM

## 2021-10-19 NOTE — DISCHARGE SUMMARY
[facility]  Discharge Summary    Valeriano Mabry Patient Status:  Inpatient    1955 MRN [id number]   [facility] 3NE-A Attending No att. providers found   Hosp Day # 2 PCP Bryson Gosselin, MD     Date of Admission: 2019    Kory This RN approved the refill request of Tadalafil on 10/19 and sent to [facility]. Note, patient was last seen by Dr Syl Pruitt on 10/12/21. Note, this patient request \"90\" supply d/t insurance and charges same as \"30\" supply.     Erectile dysfunctio Normal, Disp-1 pen, R-3      CONTINUE these medications which have NOT CHANGED    buPROPion HCl ER, XL, 300 MG Oral Tablet 24 Hr  Take 300 mg by mouth daily. , Historical    Empagliflozin (JARDIANCE) 10 MG Oral Tab  Take 1 tablet by mouth daily. , Historical

## 2021-10-25 ENCOUNTER — HOSPITAL ENCOUNTER (OUTPATIENT)
Dept: BONE DENSITY | Age: 66
Discharge: HOME | End: 2021-10-25
Attending: SPECIALIST
Payer: MEDICARE

## 2021-10-25 DIAGNOSIS — M81.0 OSTEOPOROSIS: ICD-10-CM

## 2021-10-25 PROCEDURE — 77080 DXA BONE DENSITY AXIAL: CPT | Performed by: SPECIALIST

## 2021-11-10 ENCOUNTER — HOSPITAL ENCOUNTER (OUTPATIENT)
Dept: ULTRASOUND IMAGING | Age: 66
Discharge: HOME | End: 2021-11-10
Attending: SPECIALIST
Payer: MEDICARE

## 2021-11-10 DIAGNOSIS — R06.00 DYSPNEA, UNSPECIFIED TYPE: ICD-10-CM

## 2021-11-10 DIAGNOSIS — I25.10 CAD (CORONARY ARTERY DISEASE): ICD-10-CM

## 2021-11-10 DIAGNOSIS — R60.9 EDEMA: ICD-10-CM

## 2021-11-10 PROCEDURE — 93970 EXTREMITY STUDY: CPT | Performed by: SPECIALIST

## 2021-11-12 ENCOUNTER — LAB ENCOUNTER (OUTPATIENT)
Dept: LAB | Facility: HOSPITAL | Age: 66
End: 2021-11-12
Attending: SPECIALIST
Payer: MEDICARE

## 2021-11-12 DIAGNOSIS — Z01.818 PREOP EXAMINATION: ICD-10-CM

## 2021-11-12 DIAGNOSIS — Z11.59 ENCOUNTER FOR SCREENING FOR OTHER VIRAL DISEASES: ICD-10-CM

## 2021-11-15 ENCOUNTER — RT VISIT (OUTPATIENT)
Dept: RESPIRATORY THERAPY | Facility: HOSPITAL | Age: 66
End: 2021-11-15
Attending: SPECIALIST
Payer: MEDICARE

## 2021-11-15 ENCOUNTER — HOSPITAL ENCOUNTER (OUTPATIENT)
Dept: CV DIAGNOSTICS | Facility: HOSPITAL | Age: 66
Discharge: HOME | End: 2021-11-15
Attending: SPECIALIST
Payer: MEDICARE

## 2021-11-15 DIAGNOSIS — R06.00 DYSPNEA, UNSPECIFIED TYPE: ICD-10-CM

## 2021-11-15 DIAGNOSIS — R60.9 EDEMA: ICD-10-CM

## 2021-11-15 DIAGNOSIS — R06.00 DYSPNEA: ICD-10-CM

## 2021-11-15 DIAGNOSIS — I25.10 CAD (CORONARY ARTERY DISEASE): ICD-10-CM

## 2021-11-15 PROCEDURE — 94010 BREATHING CAPACITY TEST: CPT

## 2021-11-15 PROCEDURE — 94729 DIFFUSING CAPACITY: CPT

## 2021-11-15 PROCEDURE — 94726 PLETHYSMOGRAPHY LUNG VOLUMES: CPT

## 2021-11-15 PROCEDURE — 93306 TTE W/DOPPLER COMPLETE: CPT | Performed by: SPECIALIST

## 2021-11-15 NOTE — PROCEDURES
Findings:  FEV1 is 1.24L, 51% predicted. FVC is 1.60L, 51% predicted. FEV1/ FVC ratio is 0.78. The flow-volume loop demonstrates a restrictive pattern. The TLC is 4.16L, 79% predicted. The residual volume 2.52L, 121% predicted.   The diffusion capacity

## 2021-12-14 ENCOUNTER — HOSPITAL ENCOUNTER (OUTPATIENT)
Dept: CV DIAGNOSTICS | Facility: HOSPITAL | Age: 66
Discharge: HOME | End: 2021-12-14
Attending: SPECIALIST
Payer: MEDICARE

## 2021-12-14 PROCEDURE — 93017 CV STRESS TEST TRACING ONLY: CPT | Performed by: SPECIALIST

## 2021-12-14 PROCEDURE — 78452 HT MUSCLE IMAGE SPECT MULT: CPT | Performed by: SPECIALIST

## 2021-12-14 PROCEDURE — 93018 CV STRESS TEST I&R ONLY: CPT | Performed by: SPECIALIST

## 2022-06-15 ENCOUNTER — HOSPITAL ENCOUNTER (OUTPATIENT)
Dept: MRI IMAGING | Facility: HOSPITAL | Age: 67
Discharge: HOME | End: 2022-06-15
Attending: SPECIALIST
Payer: MEDICARE

## 2022-06-15 DIAGNOSIS — M54.17 RADICULITIS, LUMBOSACRAL: ICD-10-CM

## 2022-06-15 PROCEDURE — 72148 MRI LUMBAR SPINE W/O DYE: CPT | Performed by: SPECIALIST

## 2022-07-28 ENCOUNTER — OFFICE VISIT (OUTPATIENT)
Dept: PODIATRY CLINIC | Facility: CLINIC | Age: 67
End: 2022-07-28
Payer: MEDICARE

## 2022-07-28 DIAGNOSIS — L60.0 ONYCHOCRYPTOSIS: ICD-10-CM

## 2022-07-28 DIAGNOSIS — M79.674 PAIN OF TOE OF RIGHT FOOT: ICD-10-CM

## 2022-07-28 DIAGNOSIS — N18.30 STAGE 3 CHRONIC KIDNEY DISEASE, UNSPECIFIED WHETHER STAGE 3A OR 3B CKD (HCC): ICD-10-CM

## 2022-07-28 DIAGNOSIS — E11.42 DIABETIC PERIPHERAL NEUROPATHY (HCC): ICD-10-CM

## 2022-07-28 DIAGNOSIS — B35.1 ONYCHOMYCOSIS: Primary | ICD-10-CM

## 2022-07-28 DIAGNOSIS — L60.2 OG (ONYCHOGRYPHOSIS): ICD-10-CM

## 2022-07-28 PROCEDURE — 99214 OFFICE O/P EST MOD 30 MIN: CPT | Performed by: PODIATRIST

## 2022-07-29 ENCOUNTER — EXTERNAL RECORD (OUTPATIENT)
Dept: OTHER | Age: 67
End: 2022-07-29

## 2022-07-29 LAB — RETINOPATHY PRESENT (RTP): NORMAL

## 2022-08-02 ENCOUNTER — OFFICE VISIT (OUTPATIENT)
Dept: SURGERY | Facility: CLINIC | Age: 67
End: 2022-08-02
Payer: MEDICARE

## 2022-08-02 VITALS — DIASTOLIC BLOOD PRESSURE: 68 MMHG | HEART RATE: 94 BPM | SYSTOLIC BLOOD PRESSURE: 108 MMHG

## 2022-08-02 DIAGNOSIS — M48.062 SPINAL STENOSIS OF LUMBAR REGION WITH NEUROGENIC CLAUDICATION: Primary | ICD-10-CM

## 2022-08-02 PROCEDURE — 99204 OFFICE O/P NEW MOD 45 MIN: CPT | Performed by: NEUROLOGICAL SURGERY

## 2022-08-02 RX ORDER — INSULIN GLARGINE 100 [IU]/ML
INJECTION, SOLUTION SUBCUTANEOUS
COMMUNITY
Start: 2022-07-28

## 2022-08-02 RX ORDER — PHENTERMINE HYDROCHLORIDE 37.5 MG/1
37.5 TABLET ORAL DAILY PRN
COMMUNITY
Start: 2022-07-10

## 2022-08-02 RX ORDER — GABAPENTIN 100 MG/1
CAPSULE ORAL
COMMUNITY
Start: 2022-02-07

## 2022-08-02 RX ORDER — DAPAGLIFLOZIN 10 MG/1
10 TABLET, FILM COATED ORAL DAILY
COMMUNITY
Start: 2022-07-03

## 2022-08-02 RX ORDER — TRAZODONE HYDROCHLORIDE 50 MG/1
TABLET ORAL
COMMUNITY
Start: 2022-07-06

## 2022-08-02 RX ORDER — ROPINIROLE 0.25 MG/1
TABLET, FILM COATED ORAL
COMMUNITY
Start: 2021-10-18

## 2022-08-02 RX ORDER — CARVEDILOL 3.12 MG/1
TABLET ORAL
COMMUNITY
Start: 2022-06-22

## 2022-08-02 RX ORDER — ALLOPURINOL 300 MG/1
TABLET ORAL
COMMUNITY
Start: 2021-07-01

## 2022-08-02 RX ORDER — SUMATRIPTAN 100 MG/1
TABLET, FILM COATED ORAL
COMMUNITY
Start: 2022-02-05

## 2022-08-02 RX ORDER — LEVOTHYROXINE SODIUM 0.12 MG/1
TABLET ORAL
COMMUNITY
Start: 2022-05-23

## 2022-08-02 RX ORDER — DULAGLUTIDE 1.5 MG/.5ML
INJECTION, SOLUTION SUBCUTANEOUS
COMMUNITY
Start: 2022-07-21

## 2022-08-02 NOTE — PROGRESS NOTES
Pt here for spinal stenosis. Pt is currently having pain. Pt takes advil for pain. Pt gets lower back pain when she stands for long period of time. Lower back pain that runs down to left leg.                   Review of Systems:    Hand Dominance: right  General: weight change  Neuro: no symptoms reported  Head: dizziness/spinning  Musculoskeletal: bone pain  Cardiovascular: no symptoms reported  Gastrointestinal: no symptoms reported  Genitourinary: no symptoms reported  Respiratory: shortness of breath  Eyes: double vision  Skin: non-healing lesions  Mouth & throat: no symptoms reported  Neck: pain  Nose: no symptoms reported  Psychiatric: anxiety and sadness

## 2022-08-04 ENCOUNTER — TELEPHONE (OUTPATIENT)
Dept: PODIATRY CLINIC | Facility: CLINIC | Age: 67
End: 2022-08-04

## 2022-08-04 NOTE — TELEPHONE ENCOUNTER
Spoke with patient states would like to talk to PCP as she already has liver damage and is unsure if the lamisil would be safe to take.

## 2022-08-05 ENCOUNTER — TELEPHONE (OUTPATIENT)
Dept: PAIN CLINIC | Facility: CLINIC | Age: 67
End: 2022-08-05

## 2022-08-05 ENCOUNTER — OFFICE VISIT (OUTPATIENT)
Dept: PAIN CLINIC | Facility: CLINIC | Age: 67
End: 2022-08-05
Payer: MEDICARE

## 2022-08-05 VITALS
SYSTOLIC BLOOD PRESSURE: 118 MMHG | OXYGEN SATURATION: 97 % | DIASTOLIC BLOOD PRESSURE: 58 MMHG | WEIGHT: 237 LBS | BODY MASS INDEX: 38 KG/M2 | HEART RATE: 93 BPM

## 2022-08-05 DIAGNOSIS — M48.062 LUMBAR STENOSIS WITH NEUROGENIC CLAUDICATION: Primary | ICD-10-CM

## 2022-08-05 PROCEDURE — 99204 OFFICE O/P NEW MOD 45 MIN: CPT | Performed by: PHYSICIAN ASSISTANT

## 2022-08-05 NOTE — TELEPHONE ENCOUNTER
Question Answer Comment   Anesthesia Type Local    Provider [facility] Lab    Procedure Lumbar NINI L5/S1   Medical clearance requested (will send to Pain Navigator) No    Patient has Medicare coverage?  Yes

## 2022-08-23 ENCOUNTER — TELEPHONE (OUTPATIENT)
Dept: SURGERY | Facility: CLINIC | Age: 67
End: 2022-08-23

## 2022-12-08 ENCOUNTER — HOSPITAL ENCOUNTER (OUTPATIENT)
Dept: GENERAL RADIOLOGY | Facility: HOSPITAL | Age: 67
Discharge: HOME | End: 2022-12-08
Attending: SPECIALIST
Payer: MEDICARE

## 2022-12-08 DIAGNOSIS — M16.0 ARTHRITIS OF BOTH HIPS: ICD-10-CM

## 2022-12-08 PROCEDURE — 73523 X-RAY EXAM HIPS BI 5/> VIEWS: CPT | Performed by: SPECIALIST

## 2022-12-29 ENCOUNTER — ORDER TRANSCRIPTION (OUTPATIENT)
Dept: PHYSICAL THERAPY | Facility: HOSPITAL | Age: 67
End: 2022-12-29

## 2022-12-29 DIAGNOSIS — M48.061 LUMBAR STENOSIS: Primary | ICD-10-CM

## 2023-01-16 ENCOUNTER — APPOINTMENT (OUTPATIENT)
Dept: PHYSICAL THERAPY | Age: 68
End: 2023-01-16
Attending: SPECIALIST
Payer: MEDICARE

## 2023-01-16 ENCOUNTER — TELEPHONE (OUTPATIENT)
Dept: PHYSICAL THERAPY | Facility: HOSPITAL | Age: 68
End: 2023-01-16

## 2023-01-18 ENCOUNTER — APPOINTMENT (OUTPATIENT)
Dept: PHYSICAL THERAPY | Age: 68
End: 2023-01-18
Attending: SPECIALIST
Payer: MEDICARE

## 2023-01-23 ENCOUNTER — OFFICE VISIT (OUTPATIENT)
Dept: PHYSICAL THERAPY | Age: 68
End: 2023-01-23
Attending: SPECIALIST
Payer: MEDICARE

## 2023-01-23 DIAGNOSIS — M54.16 CHRONIC LEFT-SIDED LUMBAR RADICULOPATHY: ICD-10-CM

## 2023-01-23 DIAGNOSIS — M54.16 LUMBAR RADICULOPATHY, CHRONIC: ICD-10-CM

## 2023-01-23 PROCEDURE — 97163 PT EVAL HIGH COMPLEX 45 MIN: CPT

## 2023-01-23 PROCEDURE — 97110 THERAPEUTIC EXERCISES: CPT

## 2023-01-25 ENCOUNTER — OFFICE VISIT (OUTPATIENT)
Dept: PHYSICAL THERAPY | Age: 68
End: 2023-01-25
Attending: SPECIALIST
Payer: MEDICARE

## 2023-01-25 PROCEDURE — 97110 THERAPEUTIC EXERCISES: CPT

## 2023-01-30 ENCOUNTER — APPOINTMENT (OUTPATIENT)
Dept: PHYSICAL THERAPY | Age: 68
End: 2023-01-30
Attending: SPECIALIST
Payer: MEDICARE

## 2023-02-02 ENCOUNTER — APPOINTMENT (OUTPATIENT)
Dept: PHYSICAL THERAPY | Age: 68
End: 2023-02-02
Attending: SPECIALIST
Payer: MEDICARE

## 2023-02-02 ENCOUNTER — TELEPHONE (OUTPATIENT)
Dept: PHYSICAL THERAPY | Facility: HOSPITAL | Age: 68
End: 2023-02-02

## 2023-02-07 ENCOUNTER — APPOINTMENT (OUTPATIENT)
Dept: PHYSICAL THERAPY | Age: 68
End: 2023-02-07
Attending: SPECIALIST
Payer: MEDICARE

## 2023-02-10 ENCOUNTER — APPOINTMENT (OUTPATIENT)
Dept: PHYSICAL THERAPY | Age: 68
End: 2023-02-10
Attending: SPECIALIST
Payer: MEDICARE

## 2023-06-17 ENCOUNTER — APPOINTMENT (OUTPATIENT)
Dept: GENERAL RADIOLOGY | Facility: HOSPITAL | Age: 68
End: 2023-06-17
Attending: EMERGENCY MEDICINE
Payer: MEDICARE

## 2023-06-17 ENCOUNTER — HOSPITAL ENCOUNTER (EMERGENCY)
Facility: HOSPITAL | Age: 68
Discharge: HOME | End: 2023-06-17
Attending: EMERGENCY MEDICINE
Payer: MEDICARE

## 2023-06-17 VITALS
TEMPERATURE: 98 F | OXYGEN SATURATION: 99 % | HEIGHT: 66 IN | BODY MASS INDEX: 33.75 KG/M2 | RESPIRATION RATE: 17 BRPM | WEIGHT: 210 LBS | DIASTOLIC BLOOD PRESSURE: 79 MMHG | SYSTOLIC BLOOD PRESSURE: 153 MMHG | HEART RATE: 76 BPM

## 2023-06-17 DIAGNOSIS — S32.020A COMPRESSION FRACTURE OF L2 VERTEBRA, INITIAL ENCOUNTER (HCC): Primary | ICD-10-CM

## 2023-06-17 PROCEDURE — 99284 EMERGENCY DEPT VISIT MOD MDM: CPT

## 2023-06-17 PROCEDURE — 99283 EMERGENCY DEPT VISIT LOW MDM: CPT

## 2023-06-17 PROCEDURE — 72100 X-RAY EXAM L-S SPINE 2/3 VWS: CPT | Performed by: EMERGENCY MEDICINE

## 2023-06-17 PROCEDURE — 73502 X-RAY EXAM HIP UNI 2-3 VIEWS: CPT | Performed by: EMERGENCY MEDICINE

## 2023-06-17 RX ORDER — TIZANIDINE 2 MG/1
2 TABLET ORAL EVERY 6 HOURS PRN
COMMUNITY

## 2023-06-17 RX ORDER — HYDROCODONE BITARTRATE AND ACETAMINOPHEN 7.5; 325 MG/1; MG/1
1 TABLET ORAL EVERY 6 HOURS PRN
Qty: 10 TABLET | Refills: 0 | Status: SHIPPED | OUTPATIENT
Start: 2023-06-17 | End: 2023-06-22

## 2023-06-17 NOTE — ED INITIAL ASSESSMENT (HPI)
Pt drove herself to er with low right back pain since falling off last step one month ago   Denies loc  States she was walking backwards and missed the last step   Low right back pain=7/10 while moving-  Last tizandine taken 1155pm Friday    Resting on cart pain is 1/10    Also,reports forgot to take her b/p pill this am.

## 2023-07-25 ENCOUNTER — APPOINTMENT (OUTPATIENT)
Dept: GENERAL RADIOLOGY | Facility: HOSPITAL | Age: 68
End: 2023-07-25
Payer: MEDICARE

## 2023-07-25 ENCOUNTER — HOSPITAL ENCOUNTER (EMERGENCY)
Facility: HOSPITAL | Age: 68
Discharge: HOME | End: 2023-07-25
Attending: EMERGENCY MEDICINE
Payer: MEDICARE

## 2023-07-25 VITALS
BODY MASS INDEX: 33.75 KG/M2 | RESPIRATION RATE: 18 BRPM | TEMPERATURE: 98 F | DIASTOLIC BLOOD PRESSURE: 66 MMHG | OXYGEN SATURATION: 98 % | HEART RATE: 72 BPM | HEIGHT: 66 IN | WEIGHT: 210 LBS | SYSTOLIC BLOOD PRESSURE: 100 MMHG

## 2023-07-25 DIAGNOSIS — S53.409A ELBOW SPRAIN, INITIAL ENCOUNTER: Primary | ICD-10-CM

## 2023-07-25 PROCEDURE — 73080 X-RAY EXAM OF ELBOW: CPT

## 2023-07-25 PROCEDURE — 99283 EMERGENCY DEPT VISIT LOW MDM: CPT

## 2023-07-25 PROCEDURE — 73030 X-RAY EXAM OF SHOULDER: CPT

## 2023-07-25 PROCEDURE — 99284 EMERGENCY DEPT VISIT MOD MDM: CPT

## 2023-07-25 PROCEDURE — 73060 X-RAY EXAM OF HUMERUS: CPT

## 2023-07-25 NOTE — ED INITIAL ASSESSMENT (HPI)
Put heavy duffle bag in truck on Thursday. Leora Bailey a loud pop, c/o pain to right arm/shoulder. Large area of bruising noted to right upper arm. A&ox4 and ambulatory.

## 2024-01-18 ENCOUNTER — APPOINTMENT (OUTPATIENT)
Dept: GENERAL RADIOLOGY | Facility: HOSPITAL | Age: 69
End: 2024-01-18
Payer: MEDICARE

## 2024-01-18 ENCOUNTER — APPOINTMENT (OUTPATIENT)
Dept: ULTRASOUND IMAGING | Facility: HOSPITAL | Age: 69
End: 2024-01-18
Attending: EMERGENCY MEDICINE
Payer: MEDICARE

## 2024-01-18 ENCOUNTER — HOSPITAL ENCOUNTER (EMERGENCY)
Facility: HOSPITAL | Age: 69
Discharge: HOME | End: 2024-01-19
Attending: EMERGENCY MEDICINE
Payer: MEDICARE

## 2024-01-18 DIAGNOSIS — I82.B22: ICD-10-CM

## 2024-01-18 DIAGNOSIS — M79.89 LEFT ARM SWELLING: ICD-10-CM

## 2024-01-18 DIAGNOSIS — S42.295D OTHER CLOSED NONDISPLACED FRACTURE OF PROXIMAL END OF LEFT HUMERUS WITH ROUTINE HEALING, SUBSEQUENT ENCOUNTER: Primary | ICD-10-CM

## 2024-01-18 PROCEDURE — 99285 EMERGENCY DEPT VISIT HI MDM: CPT

## 2024-01-18 PROCEDURE — 73060 X-RAY EXAM OF HUMERUS: CPT

## 2024-01-18 PROCEDURE — 93971 EXTREMITY STUDY: CPT | Performed by: EMERGENCY MEDICINE

## 2024-01-18 PROCEDURE — 36415 COLL VENOUS BLD VENIPUNCTURE: CPT

## 2024-01-18 PROCEDURE — 93010 ELECTROCARDIOGRAM REPORT: CPT

## 2024-01-19 VITALS
DIASTOLIC BLOOD PRESSURE: 69 MMHG | HEIGHT: 66 IN | SYSTOLIC BLOOD PRESSURE: 168 MMHG | BODY MASS INDEX: 38.57 KG/M2 | RESPIRATION RATE: 19 BRPM | WEIGHT: 240 LBS | HEART RATE: 83 BPM | OXYGEN SATURATION: 97 % | TEMPERATURE: 98 F

## 2024-01-19 LAB
ANION GAP SERPL CALC-SCNC: 7 MMOL/L (ref 0–18)
APTT PPP: 26.3 SECONDS (ref 23.3–35.6)
ATRIAL RATE: 81 BPM
BASOPHILS # BLD AUTO: 0.11 X10(3) UL (ref 0–0.2)
BASOPHILS NFR BLD AUTO: 1 %
BUN BLD-MCNC: 30 MG/DL (ref 9–23)
CALCIUM BLD-MCNC: 8.6 MG/DL (ref 8.5–10.1)
CHLORIDE SERPL-SCNC: 107 MMOL/L (ref 98–112)
CO2 SERPL-SCNC: 28 MMOL/L (ref 21–32)
CREAT BLD-MCNC: 1.65 MG/DL
EGFRCR SERPLBLD CKD-EPI 2021: 33 ML/MIN/1.73M2 (ref 60–?)
EOSINOPHIL # BLD AUTO: 0.71 X10(3) UL (ref 0–0.7)
EOSINOPHIL NFR BLD AUTO: 6.3 %
ERYTHROCYTE [DISTWIDTH] IN BLOOD BY AUTOMATED COUNT: 15 %
GLUCOSE BLD-MCNC: 156 MG/DL (ref 70–99)
HCT VFR BLD AUTO: 37 %
HGB BLD-MCNC: 12.3 G/DL
IMM GRANULOCYTES # BLD AUTO: 0.05 X10(3) UL (ref 0–1)
IMM GRANULOCYTES NFR BLD: 0.4 %
INR BLD: 1.02 (ref 0.8–1.2)
LYMPHOCYTES # BLD AUTO: 2.84 X10(3) UL (ref 1–4)
LYMPHOCYTES NFR BLD AUTO: 25.4 %
MCH RBC QN AUTO: 28.3 PG (ref 26–34)
MCHC RBC AUTO-ENTMCNC: 33.2 G/DL (ref 31–37)
MCV RBC AUTO: 85.3 FL
MONOCYTES # BLD AUTO: 0.84 X10(3) UL (ref 0.1–1)
MONOCYTES NFR BLD AUTO: 7.5 %
NEUTROPHILS # BLD AUTO: 6.65 X10 (3) UL (ref 1.5–7.7)
NEUTROPHILS # BLD AUTO: 6.65 X10(3) UL (ref 1.5–7.7)
NEUTROPHILS NFR BLD AUTO: 59.4 %
OSMOLALITY SERPL CALC.SUM OF ELEC: 303 MOSM/KG (ref 275–295)
P AXIS: 56 DEGREES
P-R INTERVAL: 194 MS
PLATELET # BLD AUTO: 409 10(3)UL (ref 150–450)
POTASSIUM SERPL-SCNC: 3.8 MMOL/L (ref 3.5–5.1)
PROTHROMBIN TIME: 13.5 SECONDS (ref 11.6–14.8)
Q-T INTERVAL: 410 MS
QRS DURATION: 150 MS
QTC CALCULATION (BEZET): 476 MS
R AXIS: 51 DEGREES
RBC # BLD AUTO: 4.34 X10(6)UL
SODIUM SERPL-SCNC: 142 MMOL/L (ref 136–145)
T AXIS: 26 DEGREES
VENTRICULAR RATE: 81 BPM
WBC # BLD AUTO: 11.2 X10(3) UL (ref 4–11)

## 2024-01-19 PROCEDURE — 85610 PROTHROMBIN TIME: CPT | Performed by: EMERGENCY MEDICINE

## 2024-01-19 PROCEDURE — 85730 THROMBOPLASTIN TIME PARTIAL: CPT | Performed by: EMERGENCY MEDICINE

## 2024-01-19 PROCEDURE — 85025 COMPLETE CBC W/AUTO DIFF WBC: CPT | Performed by: EMERGENCY MEDICINE

## 2024-01-19 PROCEDURE — 80048 BASIC METABOLIC PNL TOTAL CA: CPT | Performed by: EMERGENCY MEDICINE

## 2024-01-19 PROCEDURE — 93005 ELECTROCARDIOGRAM TRACING: CPT

## 2024-01-19 RX ORDER — HYDROCODONE BITARTRATE AND ACETAMINOPHEN 5; 325 MG/1; MG/1
1 TABLET ORAL EVERY 6 HOURS PRN
Qty: 10 TABLET | Refills: 0 | Status: SHIPPED | OUTPATIENT
Start: 2024-01-19 | End: 2024-01-24

## 2024-01-19 RX ORDER — HYDROCODONE BITARTRATE AND ACETAMINOPHEN 5; 325 MG/1; MG/1
1 TABLET ORAL ONCE
Status: COMPLETED | OUTPATIENT
Start: 2024-01-19 | End: 2024-01-19

## 2024-01-19 NOTE — ED INITIAL ASSESSMENT (HPI)
Fall 3 weeks ago. Fracture to left upper arm. Increasing pain and swelling. \"My bicep is hard as a rock\". States can't sleep due to pain.

## 2024-01-19 NOTE — ED PROVIDER NOTES
Patient Seen in: [facility] Emergency Department      History     Chief Complaint   Patient presents with    Arm or Hand Injury     Stated Complaint: fell about 3-4 weeks ago with broken humerus in 3 places. patient sts has seen *    Subjective:   HPI    68-year-old female with past medical history as below presents with pain and swelling in her left arm after she fell and broke her humerus 3 to 4 weeks ago.  Patient states she fell off a stool and landed right on her shoulder area.  She states she went to ED in [place] and was diagnosed with shoulder fracture and followed up with an orthopedic doctor who states he would heal in a sling.  She reports gradually increasing swelling of her left arm diffusely along with pain in her left bicep area.  She states she may have aggravated things by trying to paint and was using that arm.  She denies any new fall.  She denies any chest pain or shortness of breath.    Objective:   Past Medical History:   Diagnosis Date    Anesthesia complication     slow to be extubated    Cancer (HCC)     uterine    Depression     Emphysema     Heart murmur     HIGH BLOOD PRESSURE     Hyperlipidemia     Migraines     Necrotizing fasciitis (HCC)     Osteoarthritis     Other specified diseases of blood and blood-forming organs(289.89)     Renal disorder     mild kidney disease - per patient    Sleep apnea     Thyroid disease     Type II or unspecified type diabetes mellitus without mention of complication, not stated as uncontrolled     Unspecified disorder of thyroid     Unspecified essential hypertension               Past Surgical History:   Procedure Laterality Date    APPENDECTOMY      APPENDECTOMY      CHOLECYSTECTOMY      DILATION/CURETTAGE,DIAGNOSTIC      FOREARM/WRIST SURGERY UNLISTED Right 07/13/2021    Carpal tunnel release    HYSTERECTOMY      OTHER      anal surgery for necrotizing fasciitis    OTHER SURGICAL HISTORY Bilateral 2/2/2016    Procedure: ROBOT-ASSISTED  LAPAROSCOPIC HYSTERECTOMY;  Surgeon: Han Baird MD;  Location: [facility] MAIN OR    REMOVAL GALLBLADDER                  Social History     Socioeconomic History    Marital status: Single   Tobacco Use    Smoking status: Former     Packs/day: 1.00     Years: 35.00     Additional pack years: 0.00     Total pack years: 35.00     Types: Cigarettes     Quit date: 10/28/2011     Years since quittin.2    Smokeless tobacco: Never   Vaping Use    Vaping Use: Never used   Substance and Sexual Activity    Alcohol use: Yes     Comment: rare    Drug use: No              Review of Systems   Respiratory:  Negative for shortness of breath.    Cardiovascular:  Negative for chest pain and palpitations.       Positive for stated complaint: fell about 3-4 weeks ago with broken humerus in 3 places. patient sts has seen *  Other systems are as noted in HPI.  Constitutional and vital signs reviewed.      All other systems reviewed and negative except as noted above.    Physical Exam     ED Triage Vitals [24]   BP (!) 168/69   Pulse 90   Resp 19   Temp 98.2 °F (36.8 °C)   Temp src Temporal   SpO2 100 %   O2 Device None (Room air)       Current:BP (!) 168/69   Pulse 90   Temp 98.2 °F (36.8 °C) (Temporal)   Resp 19   Ht 167.6 cm (5' 6\")   Wt 108.9 kg   SpO2 100%   BMI 38.74 kg/m²         Physical Exam  Vitals and nursing note reviewed.   Constitutional:       Appearance: She is well-developed.   HENT:      Head: Normocephalic and atraumatic.      Mouth/Throat:      Mouth: Mucous membranes are moist.   Eyes:      General: No scleral icterus.  Musculoskeletal:      Comments: Diffuse edema of the left arm from the fingers up to the shoulder   Diffuse tenderness of the upper arm  Strong radial pulse  All compartments are soft  Motor 5/5 with flexion/extension/abduction and adduction of the wrist and fingers   Skin:     General: Skin is warm and dry.   Neurological:      General: No focal deficit present.      Mental  Status: She is alert and oriented to person, place, and time.      Cranial Nerves: No cranial nerve deficit.      Motor: No weakness.   Psychiatric:         Mood and Affect: Mood normal.         Behavior: Behavior normal.               ED Course     Labs Reviewed   BASIC METABOLIC PANEL (8) - Abnormal; Notable for the following components:       Result Value    Glucose 156 (*)     BUN 30 (*)     Creatinine 1.65 (*)     Calculated Osmolality 303 (*)     eGFR-Cr 33 (*)     All other components within normal limits   CBC W/ DIFFERENTIAL - Abnormal; Notable for the following components:    WBC 11.2 (*)     Eosinophil Absolute 0.71 (*)     All other components within normal limits   PROTHROMBIN TIME (PT) - Normal   PTT, ACTIVATED - Normal   CBC WITH DIFFERENTIAL WITH PLATELET    Narrative:     The following orders were created for panel order CBC With Differential With Platelet.  Procedure                               Abnormality         Status                     ---------                               -----------         ------                     CBC W/ DIFFERENTIAL[[id number]]          Abnormal            Final result                 Please view results for these tests on the individual orders.     EKG    Rate, intervals and axes as noted on EKG Report.  Rate: 81  Rhythm: Sinus Rhythm  Reading: Normal sinus rhythm, RBBB, no ST/T wave changes            US VENOUS DOPPLER ARM LEFT - DIAG IMG (CPT=93971)    Result Date: 1/18/2024  CONCLUSION:  1. There is a focus of chronic, nonocclusive DVT within the left proximal subclavian vein.  No evidence of acute or occlusive DVT.  2. Evidence of a large hematoma within the left arm along the distribution of the biceps muscle measuring 10.4 x 3.2 x 5.6 cm.  This is likely secondary to recent proximal humerus fracture .   LOCATION:  [facility]   Dictated by (CST): Mark Higgins DO on 1/18/2024 at 11:45 PM     Finalized by (CST): Mark Higgins DO on 1/18/2024 at 11:48 PM       XR  HUMERUS (MIN 2 VIEWS), LEFT (CPT=73060)    Result Date: 1/18/2024  CONCLUSION:  There is suggestion of an impacted tripartite fracture of the proximal humerus.   LOCATION:  [facility]   Dictated by (CST): Mark Higgins DO on 1/18/2024 at 10:04 PM     Finalized by (CST): Mark Higgins DO on 1/18/2024 at 10:05 PM               MDM   68-year-old female with past medical history as below presents with pain and swelling in her left arm after she fell and broke her humerus 3 to 4 weeks ago.     Differential includes but is not limited to arm edema secondary to humerus fracture, displacement of fracture, DVT.  No findings concerning for compartment syndrome.    Independent interpretation of x-rays of the left humerus shows fracture of the left proximal humerus.  Radiology report reviewed as above noting suggestion of impacted tripartite fracture.    Venous duplex shows a focus of chronic, nonocclusive DVT in the left proximal subclavian vein.  Ultrasound also shows a hematoma in the area of the left biceps which is likely secondary to the recent proximal humerus fracture.    Patient states she has never been diagnosed with DVT in the past.  Discussed with heme-onc Dr. Pan who recommends anticoagulation with Eliquis for 3 months.    Labs were obtained which showed some CKD with creatinine 1.65.  Pharmacy was consulted and stated patient can have Eliquis.   provided patient coupon for Eliquis starter pack.  Advised to follow-up with PCP in [facility] for further outpatient management.  Return precaution discussed.        Medical Decision Making  Amount and/or Complexity of Data Reviewed  Labs: ordered. Decision-making details documented in ED Course.  Radiology: ordered and independent interpretation performed. Decision-making details documented in ED Course.  Discussion of management or test interpretation with external provider(s): Heme-onc    Risk  Prescription drug management.        Disposition and Plan      Clinical Impression:  1. Other closed nondisplaced fracture of proximal end of left humerus with routine healing, subsequent encounter    2. Left arm swelling    3. Subclavian vein thromboembolism, chronic, left (HCC)         Disposition:  Discharge  1/19/2024  2:10 am    Follow-up:  Jadon Guevara MD  [address] [facility] [id number] [phone]    Schedule an appointment as soon as possible for a visit      Yann Espino MD  [address]  [address]  [facility] [address]  [phone]    Schedule an appointment as soon as possible for a visit            Medications Prescribed:  Current Discharge Medication List        START taking these medications    Details   apixaban 5 MG Oral Tab Take 2 tabs (10mg) by mouth twice daily for 7 days, then take 1 tab (5mg) by mouth twice daily thereafter.  Qty: 74 tablet, Refills: 0      HYDROcodone-acetaminophen 5-325 MG Oral Tab Take 1 tablet by mouth every 6 (six) hours as needed.  Qty: 10 tablet, Refills: 0    Associated Diagnoses: Other closed nondisplaced fracture of proximal end of left humerus with routine healing, subsequent encounter

## 2024-01-19 NOTE — CM/SW NOTE
Received a call from Dr. Randolph requesting for the Eliquis coupon for the pt. MILANA spoke and gave the coupon to the pt.

## 2024-01-22 ENCOUNTER — TELEPHONE (OUTPATIENT)
Dept: HEMATOLOGY/ONCOLOGY | Facility: HOSPITAL | Age: 69
End: 2024-01-22

## 2024-01-22 NOTE — TELEPHONE ENCOUNTER
----- Message from LINDSEY Moy sent at 1/19/2024  9:01 AM CST -----  From Dr. De Leon. Pls call pt to schedule for any of the docs within the next couple weeks    Harmony Mabry- called by ED with provoked upper ext DVT, I rec'd 3 mths anticoagulation and outpt f/u with someone

## 2024-02-16 ENCOUNTER — LAB ENCOUNTER (OUTPATIENT)
Dept: LAB | Facility: HOSPITAL | Age: 69
End: 2024-02-16
Attending: SPECIALIST
Payer: MEDICARE

## 2024-02-16 ENCOUNTER — HOSPITAL ENCOUNTER (OUTPATIENT)
Dept: ULTRASOUND IMAGING | Facility: HOSPITAL | Age: 69
Discharge: HOME | End: 2024-02-16
Attending: SPECIALIST
Payer: MEDICARE

## 2024-02-16 DIAGNOSIS — N18.2 CKD (CHRONIC KIDNEY DISEASE) STAGE 2, GFR 60-89 ML/MIN: ICD-10-CM

## 2024-02-16 DIAGNOSIS — L03.116 CELLULITIS OF LEFT LEG: Primary | ICD-10-CM

## 2024-02-16 DIAGNOSIS — R60.0 EDEMA OF BOTH LEGS: ICD-10-CM

## 2024-02-16 DIAGNOSIS — N18.31 STAGE 3A CHRONIC KIDNEY DISEASE (HCC): ICD-10-CM

## 2024-02-16 LAB
ALBUMIN SERPL-MCNC: 3.4 G/DL (ref 3.4–5)
ALBUMIN/GLOB SERPL: 0.7 {RATIO} (ref 1–2)
ALP LIVER SERPL-CCNC: 215 U/L
ALT SERPL-CCNC: 20 U/L
ANION GAP SERPL CALC-SCNC: 4 MMOL/L (ref 0–18)
AST SERPL-CCNC: 25 U/L (ref 15–37)
BASOPHILS # BLD AUTO: 0.11 X10(3) UL (ref 0–0.2)
BASOPHILS NFR BLD AUTO: 1.1 %
BILIRUB SERPL-MCNC: 0.6 MG/DL (ref 0.1–2)
BUN BLD-MCNC: 51 MG/DL (ref 9–23)
CALCIUM BLD-MCNC: 9.2 MG/DL (ref 8.5–10.1)
CHLORIDE SERPL-SCNC: 104 MMOL/L (ref 98–112)
CO2 SERPL-SCNC: 30 MMOL/L (ref 21–32)
CREAT BLD-MCNC: 2.43 MG/DL
CRP SERPL-MCNC: <0.29 MG/DL (ref ?–0.3)
EGFRCR SERPLBLD CKD-EPI 2021: 21 ML/MIN/1.73M2 (ref 60–?)
EOSINOPHIL # BLD AUTO: 0.62 X10(3) UL (ref 0–0.7)
EOSINOPHIL NFR BLD AUTO: 6.1 %
ERYTHROCYTE [DISTWIDTH] IN BLOOD BY AUTOMATED COUNT: 14.3 %
FASTING STATUS PATIENT QL REPORTED: NO
GLOBULIN PLAS-MCNC: 4.7 G/DL (ref 2.8–4.4)
GLUCOSE BLD-MCNC: 212 MG/DL (ref 70–99)
HCT VFR BLD AUTO: 40.6 %
HGB BLD-MCNC: 13.5 G/DL
IMM GRANULOCYTES # BLD AUTO: 0.02 X10(3) UL (ref 0–1)
IMM GRANULOCYTES NFR BLD: 0.2 %
LYMPHOCYTES # BLD AUTO: 2.59 X10(3) UL (ref 1–4)
LYMPHOCYTES NFR BLD AUTO: 25.5 %
MCH RBC QN AUTO: 28.4 PG (ref 26–34)
MCHC RBC AUTO-ENTMCNC: 33.3 G/DL (ref 31–37)
MCV RBC AUTO: 85.5 FL
MONOCYTES # BLD AUTO: 0.91 X10(3) UL (ref 0.1–1)
MONOCYTES NFR BLD AUTO: 9 %
NEUTROPHILS # BLD AUTO: 5.91 X10 (3) UL (ref 1.5–7.7)
NEUTROPHILS # BLD AUTO: 5.91 X10(3) UL (ref 1.5–7.7)
NEUTROPHILS NFR BLD AUTO: 58.1 %
OSMOLALITY SERPL CALC.SUM OF ELEC: 306 MOSM/KG (ref 275–295)
PLATELET # BLD AUTO: 293 10(3)UL (ref 150–450)
POTASSIUM SERPL-SCNC: 3.5 MMOL/L (ref 3.5–5.1)
PROT SERPL-MCNC: 8.1 G/DL (ref 6.4–8.2)
RBC # BLD AUTO: 4.75 X10(6)UL
SODIUM SERPL-SCNC: 138 MMOL/L (ref 136–145)
WBC # BLD AUTO: 10.2 X10(3) UL (ref 4–11)

## 2024-02-16 PROCEDURE — 93970 EXTREMITY STUDY: CPT | Performed by: SPECIALIST

## 2024-02-16 PROCEDURE — 86140 C-REACTIVE PROTEIN: CPT

## 2024-02-16 PROCEDURE — 36415 COLL VENOUS BLD VENIPUNCTURE: CPT

## 2024-02-16 PROCEDURE — 85025 COMPLETE CBC W/AUTO DIFF WBC: CPT

## 2024-02-16 PROCEDURE — 80053 COMPREHEN METABOLIC PANEL: CPT

## 2024-06-14 ENCOUNTER — TELEPHONE (OUTPATIENT)
Dept: OTHER | Age: 69
End: 2024-06-14

## 2025-02-11 ENCOUNTER — APPOINTMENT (OUTPATIENT)
Dept: WOUND CARE | Facility: HOSPITAL | Age: 70
End: 2025-02-11
Attending: NURSE PRACTITIONER
Payer: MEDICARE

## 2025-02-11 ENCOUNTER — TELEPHONE (OUTPATIENT)
Dept: WOUND CARE | Facility: HOSPITAL | Age: 70
End: 2025-02-11

## 2025-02-11 NOTE — PROGRESS NOTES
CHIEF COMPLAINT:   No chief complaint on file.    HPI:   Information obtained from ***  2-11-25 INITIAL:  Patient is a 70 yo female with DM (a1c ***no recent a1c), ckd, jared (use cpap?***), cad, afib, endometrial cancer, who is presenting to clinic today with ***.  Has been treating with ***sleeps?***uses cpap?*** primary is dr. wayne  No recent a1c or labs, no recent xryas    MEDICATIONS:     Current Outpatient Medications:     apixaban 5 MG Oral Tab, Take 2 tabs (10mg) by mouth twice daily for 7 days, then take 1 tab (5mg) by mouth twice daily thereafter., Disp: 74 tablet, Rfl: 0    tiZANidine 2 MG Oral Tab, Take 1 tablet (2 mg total) by mouth every 6 (six) hours as needed., Disp: , Rfl:     FARXIGA 10 MG Oral Tab, Take 1 tablet (10 mg total) by mouth daily., Disp: , Rfl:     TRULICITY 1.5 MG/0.5ML Subcutaneous Solution Pen-injector, Inject into the skin once a week., Disp: , Rfl:     gabapentin 100 MG Oral Cap, 1 CAPSULE ORALLY THREE TIMES A DAY (TID) 30 DAY(S) (Patient not taking: Reported on 6/17/2023), Disp: , Rfl:     levothyroxine 125 MCG Oral Tab, Pt unsure of dose, Disp: , Rfl:     Phentermine HCl 37.5 MG Oral Tab, Take 1 tablet (37.5 mg total) by mouth daily as needed., Disp: , Rfl:     rOPINIRole 0.25 MG Oral Tab, Take 4 tablets (1 mg total) by mouth at bedtime., Disp: , Rfl:     silver sulfADIAZINE 1 % External Cream, , Disp: , Rfl:     SUMAtriptan Succinate 100 MG Oral Tab, TAKE 1 TABLET AS NEEDED ORALLY DAILY AS NEEDED 30, Disp: , Rfl:     traZODone 50 MG Oral Tab, 1 TABLET AT BEDTIME AS NEEDED ORALLY ONCE A DAY 90, Disp: , Rfl:     allopurinol 300 MG Oral Tab, Take 1 tablet (300 mg total) by mouth daily., Disp: , Rfl:     carvedilol 3.125 MG Oral Tab, Pt unsure of dose, Disp: , Rfl:     LANTUS SOLOSTAR 100 UNIT/ML Subcutaneous Solution Pen-injector, , Disp: , Rfl:     ergocalciferol 1.25 MG (85164 UT) Oral Cap, Take 1 capsule (50,000 Units total) by mouth once a week., Disp: , Rfl: 1    Potassium  Chloride ER 10 MEQ Oral Tab CR, Take 1 tablet (10 mEq total) by mouth 2 (two) times daily., Disp: , Rfl:     torsemide 20 MG Oral Tab, Take 1 tablet (20 mg total) by mouth daily. (Patient taking differently: Take 1 tablet (20 mg total) by mouth every other day.), Disp: 30 tablet, Rfl: 0    insulin glargine 100 UNIT/ML Subcutaneous Solution, Inject 50 Units into the skin 2 (two) times daily. (Patient taking differently: Inject 50 Units into the skin nightly.), Disp: 1 pen, Rfl: 3    buPROPion HCl ER, XL, 300 MG Oral Tablet 24 Hr, Take 1 tablet (300 mg total) by mouth daily., Disp: , Rfl:     Pravastatin Sodium 80 MG Oral Tab, Take 1 tablet (80 mg total) by mouth nightly., Disp: , Rfl:     spironolactone 25 MG Oral Tab, Take 1 tablet (25 mg total) by mouth daily., Disp: , Rfl:     Insulin Lispro, Human, (HUMALOG) 100 UNIT/ML Subcutaneous Solution, Inject 10-15 Units into the skin 3 (three) times daily before meals. (Patient not taking: Reported on 7/25/2023), Disp: , Rfl:     cetirizine (ZYRTEC) 10 MG Oral Tab, Take 1 tablet (10 mg total) by mouth daily., Disp: , Rfl:   ALLERGIES:   Allergies[1]   REVIEW OF SYSTEMS:   This information was obtained from the patient/family and chart.    See HPI for pertinent positives, otherwise 10 pt ROS negative.    HISTORY:     Past Medical History:    Anesthesia complication    slow to be extubated    Cancer (HCC)    uterine    Depression    Emphysema    Heart murmur    HIGH BLOOD PRESSURE    Hyperlipidemia    Migraines    Necrotizing fasciitis (HCC)    Osteoarthritis    Other specified diseases of blood and blood-forming organs(289.89)    Renal disorder    mild kidney disease - per patient    Sleep apnea    Thyroid disease    Type II or unspecified type diabetes mellitus without mention of complication, not stated as uncontrolled    Unspecified disorder of thyroid    Unspecified essential hypertension     Past Surgical History:   Procedure Laterality Date    Appendectomy       Appendectomy      Cholecystectomy      Dilation/curettage,diagnostic      Forearm/wrist surgery unlisted Right 2021    Carpal tunnel release    Hysterectomy      Other      anal surgery for necrotizing fasciitis    Other surgical history Bilateral 2016    Procedure: ROBOT-ASSISTED LAPAROSCOPIC HYSTERECTOMY;  Surgeon: Han Baird MD;  Location: [facility] MAIN OR    Removal gallbladder        Social History     Socioeconomic History    Marital status: Single   Tobacco Use    Smoking status: Former     Current packs/day: 0.00     Average packs/day: 1 pack/day for 35.0 years (35.0 ttl pk-yrs)     Types: Cigarettes     Start date: 10/28/1976     Quit date: 10/28/2011     Years since quittin.3    Smokeless tobacco: Never   Vaping Use    Vaping status: Never Used   Substance and Sexual Activity    Alcohol use: Yes     Comment: rare    Drug use: No     PHYSICAL EXAM:   There were no vitals filed for this visit.   Estimated body mass index is 38.74 kg/m² as calculated from the following:    Height as of 24: 66\".    Weight as of 24: 240 lb (108.9 kg).   POC Glucose   Date Value Ref Range Status   12/15/2019 362 (H) 70 - 99 mg/dL Final   12/15/2019 290 (H) 70 - 99 mg/dL Final   12/15/2019 191 (H) 70 - 99 mg/dL Final       Vital signs reviewed.Appears stated age, well groomed.    Constitutional:  Bp ***wnl for patient. Pulse Regular and wnl for patient. Respirations easy and unlabored. Temperature wnl. Weight ***normal for height. Appearance neat and clean. Appears in no acute distress. Well nourished and well developed.    Respiratory: Respiratory ***effort is easy and symmetric bilaterally. Rate is normal at rest and on ***room air.  Bilateral breath sounds are clear and equal w/ no wheezes, rales or rhonchi.    Cardiovascular:  Heart rhythm and rate regular, without murmur or gallop. ***Abnormal : irregularly irregular, +murmur    Lower extremity:  dp/pt palpable *** bilaterally. ***Extremities free  of varicosities, edema. Capillary refill < 3 seconds. Digits are warm. toenails are wnl for color, thickness and hygeine. Skin hydration wnl. + hairgrowth on legs.    Musculoskeletal:  Gait and station stable ***  Gait independent with assistance of (cane, walker, crutches, knee roller)  Patient is in wheelchair propelled by others, able to transfer with assist  Patient is in a motor scooter/chair, able to transfer with assist  Patient is dependent for all transfers and mobility  Independent with assistance of a wheelchair, able to transfer with slideboard  NWB, pivot transfer with assist  Integumentary:  refer to wound characteristics and images   Psychiatric:  Judgment and insight intact. Alert and oriented times 3. No evidence of depression, anxiety, or agitation. Calm, cooperative, and communicative. ***Appropriate interactions and affect.  EDEMA:   Calf                    Ankle                          DIAGNOSTICS:     Lab Results   Component Value Date    BUN 51 (H) 2024    CREATSERUM 2.43 (H) 2024    ALB 3.4 2024    TP 8.1 2024       WOUND ASSESSMENT:               ASSESSMENT AND PLAN:    There are no diagnoses linked to this encounter.    Discussed with patient the aspects of wound healing including:  blood flow in/out (arterial vs venous vs lymph) and managing edema with appropriate compression, wound bed optimization including moist wound healing, removal of necrosis, bioburden control, monitoring for infection, offloading and finally the patient as a whole including nutrition and increased protein intake and blood glucose control.  I discussed with the patient aspects of wound healing includin) blood flow in/out and the difference between arterial and venous and micro vs macro circulation  2) discussed wound bed optimization with necrosis and callus removal, moist wound healing.  3) discussed pressure and friction and the importance of offloading   4) discussed nutrition and  bg control  5) discussed the risk of osteo and how we would assess for this  6) finally, we discussed neuropathy and the importance of daily diabetic foot exam and appropriate diabetic foot wear with inserts.    Risks, benefits, and alternatives of current treatment plan discussed in detail.  Questions and concerns addressed. Red flags to RTC or ED reviewed.  Patient (or parent) agrees to plan.      NOTE TO PATIENT: The 21st Century Cures Act makes clinical notes like these available to patients in the interest of transparency. Clinical notes are medical documents used by physicians and care providers to communicate with each other. These documents include medical language and terminology, abbreviations, and treatment information that may sound technical and at times possibly unfamiliar. In addition, at times, the verbiage may appear blunt or direct. These documents are one tool providers use to communicate relevant information and clinical opinions of the care providers in a way that allows common understanding of the clinical context.   Patient is new to clinic has seen ehv [id number] previouslI spent   ***   minutes with the patient. This time included:    preparing to see the patient (eg, review notes and recent diagnostics),  seeing the patient, obtaining and/or reviewing separately obtained history, performing a medically appropriate examination and/or evaluation, counseling and educating the patient, documenting in the record   DISCHARGE:    There are no Patient Instructions on file for this visit.   Consuelo Cloud FNP-C, CWCN-AP, CFCN, CSWS, WCC, DWC  2/11/2025            [1] No Known Allergies

## 2025-02-19 ENCOUNTER — TELEPHONE (OUTPATIENT)
Dept: WOUND CARE | Facility: HOSPITAL | Age: 70
End: 2025-02-19

## 2025-03-03 ENCOUNTER — OFFICE VISIT (OUTPATIENT)
Dept: WOUND CARE | Facility: HOSPITAL | Age: 70
End: 2025-03-03
Attending: INTERNAL MEDICINE
Payer: MEDICARE

## 2025-03-03 VITALS
WEIGHT: 225 LBS | BODY MASS INDEX: 36.16 KG/M2 | DIASTOLIC BLOOD PRESSURE: 67 MMHG | SYSTOLIC BLOOD PRESSURE: 139 MMHG | RESPIRATION RATE: 16 BRPM | HEART RATE: 79 BPM | HEIGHT: 66 IN | TEMPERATURE: 97 F

## 2025-03-03 DIAGNOSIS — E66.812 CLASS 2 SEVERE OBESITY DUE TO EXCESS CALORIES WITH SERIOUS COMORBIDITY AND BODY MASS INDEX (BMI) OF 36.0 TO 36.9 IN ADULT (HCC): ICD-10-CM

## 2025-03-03 DIAGNOSIS — E11.65 TYPE 2 DIABETES MELLITUS WITH HYPERGLYCEMIA, WITH LONG-TERM CURRENT USE OF INSULIN (HCC): ICD-10-CM

## 2025-03-03 DIAGNOSIS — Z79.4 TYPE 2 DIABETES MELLITUS WITH HYPERGLYCEMIA, WITH LONG-TERM CURRENT USE OF INSULIN (HCC): ICD-10-CM

## 2025-03-03 DIAGNOSIS — L97.422 DIABETIC ULCER OF LEFT MIDFOOT ASSOCIATED WITH TYPE 2 DIABETES MELLITUS, WITH FAT LAYER EXPOSED (HCC): ICD-10-CM

## 2025-03-03 DIAGNOSIS — E11.621 DIABETIC ULCER OF LEFT MIDFOOT ASSOCIATED WITH TYPE 2 DIABETES MELLITUS, WITH FAT LAYER EXPOSED (HCC): ICD-10-CM

## 2025-03-03 DIAGNOSIS — I73.9 PERIPHERAL VASCULAR DISEASE, UNSPECIFIED: ICD-10-CM

## 2025-03-03 DIAGNOSIS — N18.30 STAGE 3 CHRONIC KIDNEY DISEASE, UNSPECIFIED WHETHER STAGE 3A OR 3B CKD (HCC): ICD-10-CM

## 2025-03-03 DIAGNOSIS — L97.522 ULCER OF LEFT FOOT, WITH FAT LAYER EXPOSED (HCC): Primary | ICD-10-CM

## 2025-03-03 DIAGNOSIS — E66.01 CLASS 2 SEVERE OBESITY DUE TO EXCESS CALORIES WITH SERIOUS COMORBIDITY AND BODY MASS INDEX (BMI) OF 36.0 TO 36.9 IN ADULT (HCC): ICD-10-CM

## 2025-03-03 LAB — GLUCOSE BLD-MCNC: 177 MG/DL (ref 70–99)

## 2025-03-03 PROCEDURE — 97597 DBRDMT OPN WND 1ST 20 CM/<: CPT | Performed by: INTERNAL MEDICINE

## 2025-03-03 PROCEDURE — 99214 OFFICE O/P EST MOD 30 MIN: CPT

## 2025-03-03 PROCEDURE — 82962 GLUCOSE BLOOD TEST: CPT | Performed by: INTERNAL MEDICINE

## 2025-03-03 RX ORDER — METOLAZONE 2.5 MG/1
2.5 TABLET ORAL DAILY
COMMUNITY

## 2025-03-03 RX ORDER — TIRZEPATIDE 12.5 MG/.5ML
12.5 INJECTION, SOLUTION SUBCUTANEOUS WEEKLY
COMMUNITY

## 2025-03-03 NOTE — PROGRESS NOTES
[place] [facility] CONSULTATION NOTE  MINERVA MARTÍNEZ MD  3/3/2025    Subjective   Bceky Mabry is a 69 year old female.    Chief Complaint   Patient presents with    Wound Care     Patient is here for an initial consult. She presents with a diabetic foot ulcer on her left foot that began a couple of months ago. Her current pain is 2/10, but does get up to a 9 when walking. She did have it debrided by a podiatrist once.      HPI    68 yo CF here for eval and management of open wound left foot -       68 yo CF with diabetic foot ulcer - open wound plantar foot, left - etiology unclear - duration - 2 months - seen podiatrist recently - was debrided.        Diabetes status: yes  A1c ?     Smoker status: former - quit 2011- 35 PY    Past Medical history, Surgical history, Social history, Family history reviewed with patient.   Medications reviewed.   Epic chart notes including provider notes, labs, imaging etc. Reviewed.   [facility] care everywhere queried and results reviewed.     Past Medical Hx:  Past Medical History:    Anesthesia complication    slow to be extubated    Cancer (HCC)    uterine    Depression    Emphysema    Heart murmur    HIGH BLOOD PRESSURE    Hyperlipidemia    Migraines    Necrotizing fasciitis (HCC)    Osteoarthritis    Other specified diseases of blood and blood-forming organs(289.89)    Renal disorder    mild kidney disease - per patient    Sleep apnea    Thyroid disease    Type II or unspecified type diabetes mellitus without mention of complication, not stated as uncontrolled    Unspecified disorder of thyroid    Unspecified essential hypertension     Past Surgical Hx:  Past Surgical History:   Procedure Laterality Date    Appendectomy      Appendectomy      Cholecystectomy      Dilation/curettage,diagnostic      Forearm/wrist surgery unlisted Right 07/13/2021    Carpal tunnel release    Hysterectomy      Other      anal surgery for necrotizing fasciitis    Other surgical history Bilateral  2016    Procedure: ROBOT-ASSISTED LAPAROSCOPIC HYSTERECTOMY;  Surgeon: Han Baird MD;  Location: [facility] MAIN OR    Removal gallbladder       Problem List:  Patient Active Problem List   Diagnosis    Endometrial cancer (HCC)    Snoring    Atrial fibrillation, unspecified type (HCC)    Abscess or cellulitis of toe, left    Renal insufficiency    Type 2 diabetes mellitus with hyperglycemia, with long-term current use of insulin (HCC)    Hypokalemia    JUICE (acute kidney injury)    CKD (chronic kidney disease) stage 3, GFR 30-59 ml/min (HCC)    BERTA on CPAP    Morbid obesity (HCC)    CAD (coronary artery disease)     Social History:  Social History     Socioeconomic History    Marital status: Single   Tobacco Use    Smoking status: Former     Current packs/day: 0.00     Average packs/day: 1 pack/day for 35.0 years (35.0 ttl pk-yrs)     Types: Cigarettes     Start date: 10/28/1976     Quit date: 10/28/2011     Years since quittin.3    Smokeless tobacco: Never   Vaping Use    Vaping status: Never Used   Substance and Sexual Activity    Alcohol use: Yes     Comment: rare    Drug use: No     Family History:  History reviewed. No pertinent family history.  Allergies:  Allergies[1]  Current Meds:  Current Outpatient Medications   Medication Sig Dispense Refill    Tirzepatide (MOUNJARO) 12.5 MG/0.5ML Subcutaneous Solution Auto-injector Inject 12.5 1e11 Vector Genomes/m2 into the skin once a week.      metOLazone 2.5 MG Oral Tab Take 1 tablet (2.5 mg total) by mouth daily.      tiZANidine 2 MG Oral Tab Take 1 tablet (2 mg total) by mouth every 6 (six) hours as needed.      FARXIGA 10 MG Oral Tab Take 1 tablet (10 mg total) by mouth daily.      gabapentin 100 MG Oral Cap       levothyroxine 125 MCG Oral Tab Pt unsure of dose      Phentermine HCl 37.5 MG Oral Tab Take 1 tablet (37.5 mg total) by mouth daily as needed.      rOPINIRole 0.25 MG Oral Tab Take 4 tablets (1 mg total) by mouth at bedtime.      silver  sulfADIAZINE 1 % External Cream       SUMAtriptan Succinate 100 MG Oral Tab TAKE 1 TABLET AS NEEDED ORALLY DAILY AS NEEDED 30      traZODone 50 MG Oral Tab 1 TABLET AT BEDTIME AS NEEDED ORALLY ONCE A DAY 90      allopurinol 300 MG Oral Tab Take 1 tablet (300 mg total) by mouth daily.      carvedilol 3.125 MG Oral Tab Pt unsure of dose      LANTUS SOLOSTAR 100 UNIT/ML Subcutaneous Solution Pen-injector       Potassium Chloride ER 10 MEQ Oral Tab CR Take 1 tablet (10 mEq total) by mouth 2 (two) times daily.      torsemide 20 MG Oral Tab Take 1 tablet (20 mg total) by mouth daily. 30 tablet 0    insulin glargine 100 UNIT/ML Subcutaneous Solution Inject 50 Units into the skin 2 (two) times daily. 1 pen 3    buPROPion HCl ER, XL, 300 MG Oral Tablet 24 Hr Take 1 tablet (300 mg total) by mouth daily.      Pravastatin Sodium 80 MG Oral Tab Take 1 tablet (80 mg total) by mouth nightly.      spironolactone 25 MG Oral Tab Take 1 tablet (25 mg total) by mouth daily.      Insulin Lispro, Human, (HUMALOG) 100 UNIT/ML Subcutaneous Solution Inject 10-15 Units into the skin 3 (three) times daily before meals.      cetirizine (ZYRTEC) 10 MG Oral Tab Take 1 tablet (10 mg total) by mouth daily.      apixaban 5 MG Oral Tab Take 2 tabs (10mg) by mouth twice daily for 7 days, then take 1 tab (5mg) by mouth twice daily thereafter. (Patient not taking: Reported on 3/3/2025) 74 tablet 0    TRULICITY 1.5 MG/0.5ML Subcutaneous Solution Pen-injector Inject into the skin once a week. (Patient not taking: Reported on 3/3/2025)      ergocalciferol 1.25 MG (06805 UT) Oral Cap Take 1 capsule (50,000 Units total) by mouth once a week. (Patient not taking: Reported on 3/3/2025)  1     Tobacco Counseling:  Counseling given: Not Answered       REVIEW OF SYSTEMS:   CONSTITUTIONAL:  Denies unusual weight gain/loss, fever, chills, or fatigue.  EENT:  Eyes:  Denies eye pain, visual loss, blurred vision, double vision or yellow sclerae.   CARDIOVASCULAR:   Denies chest pain, chest pressure, chest discomfort, palpitations, dyspnea on exertion or at rest.  RESPIRATORY:  Denies shortness of breath, wheezing, cough or sputum.  GASTROINTESTINAL:  Denies abdominal pain, nausea, vomiting, constipation, diarrhea, or blood in stool.  MUSCULOSKELETAL:  Denies weakness  NEUROLOGICAL:  Denies headache, seizures, dizziness, syncope      Objective   Objective  Physical Exam    Wound Assessment  Wound 03/03/25 #1 Left plantar foot Foot Left;Plantar (Active)   Date First Assessed/Time First Assessed: 03/03/25 1411    Wound Number (Wound Clinic Only): #1 Left plantar foot  Primary Wound Type: Diabetic Ulcer  Location: Foot  Wound Location Orientation: Left;Plantar      Assessments 3/3/2025  2:24 PM   Wound Image      Drainage Amount Scant   Drainage Description Yellow;Serous   Wound Length (cm) 0.8 cm   Wound Width (cm) 0.8 cm   Wound Surface Area (cm^2) 0.64 cm^2   Wound Depth (cm) 0.2 cm   Wound Volume (cm^3) 0.128 cm^3   Margins Well-defined edges   Non-staged Wound Description Full thickness   Jennifer-wound Assessment Dry;Callous   Wound Granulation Tissue Spongy;Pink   Wound Bed Granulation (%) 50 % (50% callous)   Wound Odor None   Shape 50% callous   Tunneling? No   Undermining? No   Sinus Tracts? No   Guevara Scale Grade 2       Active Orders   Date Order Priority Status Authorizing Provider   03/03/25 1636 Debridement Diabetic Ulcer Left;Plantar Foot Routine Active Rojas Robert MD               PHYSICAL EXAM:   /67   Pulse 79   Temp 97.2 °F (36.2 °C)   Resp 16   Ht 66\"   Wt 225 lb (102.1 kg)   BMI 36.32 kg/m²  Estimated body mass index is 36.32 kg/m² as calculated from the following:    Height as of this encounter: 66\".    Weight as of this encounter: 225 lb (102.1 kg).   Vital signs reviewed.Appears stated age, well groomed.  Physical Exam:  GEN:  Patient is alert, awake and oriented, well developed, well nourished, no apparent distress.  HEENT:  Head:   Normocephalic, atraumatic   Eyes: EOMI, PERRLA, no scleral icterus, conjunctivae clear bilaterally, no eye discharge  NECK: Supple, no CLAD, no JVD, no thyromegaly.  HEART:  Regular rate and rhythm, no murmurs, rubs or gallops.  LUNGS: Clear to auscultation bilterally, no rales/rhonchi/wheezing.  ABDOMEN:  Soft, nondistended, nontender,   EXTREMITIES:    Monophasic pulse waveform left DP  NEURO:  No deficit, normal gait, strength grossly intact.     Assessment   Assessment    Encounter Diagnosis  1. Ulcer of left foot, with fat layer exposed (formerly [facility])    2. Class 2 severe obesity due to excess calories with serious comorbidity and body mass index (BMI) of 36.0 to 36.9 in adult (formerly [facility])    3. Diabetic ulcer of left midfoot associated with type 2 diabetes mellitus, with fat layer exposed (formerly [facility])    4. Type 2 diabetes mellitus with hyperglycemia, with long-term current use of insulin (formerly [facility])    5. Stage 3 chronic kidney disease, unspecified whether stage 3a or 3b CKD (formerly [facility])    6. Peripheral vascular disease, unspecified        Problem List  Patient Active Problem List   Diagnosis    Endometrial cancer (formerly [facility])    Snoring    Atrial fibrillation, unspecified type (formerly [facility])    Abscess or cellulitis of toe, left    Renal insufficiency    Type 2 diabetes mellitus with hyperglycemia, with long-term current use of insulin (formerly [facility])    Hypokalemia    JUICE (acute kidney injury)    CKD (chronic kidney disease) stage 3, GFR 30-59 ml/min (formerly [facility])    BERTA on CPAP    Morbid obesity (formerly [facility])    CAD (coronary artery disease)       Plan    Check TBI order placed  Honey gel for enzymatic debridement.   Intense offloading needed.   Provided offloading shoe and insert  Intense BS control needed < 180 at all times.   Edema management  Wear compression garment  Low salt diet  Leg elevation.   Check labs - order placed  Return one week.       PROCEDURES:     Debridement Diabetic Ulcer Left;Plantar Foot   Wound 03/03/25 #1 Left plantar foot Foot Left;Plantar     Performed  by: Rojas Robert MD  Authorized by: Rojas Robert MD       Consent   Consent obtained? verbal  Consent given by: patient     Debridement Details  Performed by: physician  Debridement type: conservative sharp  Pain control administration type: topical     Pre-debridement measurements  Length (cm): 0.8  Width (cm): 0.8  Depth (cm): 0.2  Surface Area (cm^2): 0.64     Post-debridement measurements  Length (cm): 0.5  Width (cm): 0.5  Depth (cm): 0.1  Percent debrided: 100%  Surface Area (cm^2): 0.25  Area Debrided (cm^2): 0.25  Volume (cm^3): 0.03     Devitalized tissue debrided: biofilm and callus  Instrument(s) utilized: blade and forceps  Bleeding: none  Hemostasis obtained with: not applicable  Procedural pain (0-10): 0  Post-procedural pain: 0   Response to treatment: procedure was tolerated well                  Patient Instructions     Return one week    Wound Cleaning and Dressings:    Wash your hands with soap and water. Always wear gloves while changing dressings. Donot touch wound / lizbeth-wound skin with un-gloved hands. Remove old dressing, discard and place into trash.      DRESSINGS: honey gel / gauze  Change dressing daily.  Wear spandagrip    Off-Loading:  Darco shoe with PEg insert    Miscellaneous Instructions:  Supplement with a daily multivitamin   Low salt diet  Intense blood sugar control - Goal Blood sugar below 180 at all times recommended.  Increase protein intake / consider protein supplements - see below  Elevate extremities at all times when sitting / laying down.    DIETARY MODIFICATIONS TO HELP WITH WOUND HEALING:    Protein: Meats, beans, eggs, milk and yogurt particularly Greek yogurt), tofu, soy nuts, soy protein products    Vitamin C: Citrus fruits and juices, strawberries, tomatoes, tomato juice, peppers, baked potatoes, spinach, broccoli, cauliflower, [place] sprouts, cabbage    Vitamin A: Dark green, leafy vegetables, orange or yellow vegetables, cantaloupe, fortified  dairy products, liver, fortified cereals    Zinc: Fortified cereals, red meats, seafood    Consider Trent by [employer] (These are essential branch chain amino acids that help with tissue building and wound healing) and take 2 packets/day. you can order online at [employer] or [employer]    ADDITIONAL REMINDERS:    The treatment plan has been discussed at length with you and your provider. Follow all instructions carefully, it is very important. If you do not follow all instructions, you are at  risk of your wound not healing, infection, possible loss of limb and even end of life.  Please call the clinic during regular business hours ( 7:30 AM - 5:30 PM) if you notice increased bleeding, redness, warmth, pain or pus like drainage or start running a fever greater than 100.3.    For after hour emergencies, please call your primary physician or go to the nearest emergency room.          Orders  Orders Placed This Encounter   Procedures    CBC With Differential With Platelet    Prealbumin    Hemoglobin A1C    C-Reactive Protein    Sed Rate, Westergren (Automated)    Comp Metabolic Panel (14)    Debridement Diabetic Ulcer Left;Plantar Foot       Patient/Caregiver Education: There are no barriers to learning. Medical education for above diagnosis given.   Answered all questions.    Outcome: Patient verbalizes understanding. Patient is notified to call with any questions, complications, allergies, or worsening or changing symptoms.  Patient is to call with any side effects or complications as a result of the treatments today.      DOCUMENTATION OF TIME SPENT: Code selection for this visit was based on time spent : 60 min on date of service in preparing to see the patient, obtaining and/or reviewing separately obtained history, performing a medically appropriate examination, counseling and educating the patient/family/caregiver, ordering medications or testing, referring and communicating with other healthcare providers,  documenting clinical information in the E HR, independently interpreting results and communicating results to the patient/family/caregiver and care coordination with the patient's other providers.    Followup: Return in 1 week (on 3/10/2025) for Wound followup.      Note to Patient:  The 21st Century Cures Act makes medical notes like these available to patients in the interest of transparency. However, be advised this is a medical document and is intended as [id number] communication; it is written in medical language and may appear blunt, direct, or contain abbreviations or verbiage that are unfamiliar. Medical documents are intended to carry relevant information, facts as evident, and the clinical opinion of the practitioner.    Also, please note that this report has been produced using speech recognition software and may contain errors related to that system including, but not limited to, errors in grammar, punctuation, and spelling, as well as words and phrases that possibly may have been recognized inappropriately.  If there are any questions or concerns, contact the dictating provider for clarification.      Rojas Short MD  3/3/2025  2:43 PM            [1] No Known Allergies

## 2025-03-03 NOTE — PROGRESS NOTES
Patient ID: Becky Mabry is a 69 year old female.    Debridement Diabetic Ulcer Left;Plantar Foot   Wound 03/03/25 #1 Left plantar foot Foot Left;Plantar    Performed by: Rojas Robert MD  Authorized by: Rojas Robert MD      Consent   Consent obtained? verbal  Consent given by: patient    Debridement Details  Performed by: physician  Debridement type: conservative sharp  Pain control administration type: topical    Pre-debridement measurements  Length (cm): 0.8  Width (cm): 0.8  Depth (cm): 0.2  Surface Area (cm^2): 0.64    Post-debridement measurements  Length (cm): 0.5  Width (cm): 0.5  Depth (cm): 0.1  Percent debrided: 100%  Surface Area (cm^2): 0.25  Area Debrided (cm^2): 0.25  Volume (cm^3): 0.03    Devitalized tissue debrided: biofilm and callus  Instrument(s) utilized: blade and forceps  Bleeding: none  Hemostasis obtained with: not applicable  Procedural pain (0-10): 0  Post-procedural pain: 0   Response to treatment: procedure was tolerated well

## 2025-03-03 NOTE — PATIENT INSTRUCTIONS
Return one week    Wound Cleaning and Dressings:    Wash your hands with soap and water. Always wear gloves while changing dressings. Donot touch wound / lizbeth-wound skin with un-gloved hands. Remove old dressing, discard and place into trash.      DRESSINGS: honey gel / gauze  Change dressing daily.  Wear spandagrip    Off-Loading:  Darco shoe with PEg insert    Miscellaneous Instructions:  Supplement with a daily multivitamin   Low salt diet  Intense blood sugar control - Goal Blood sugar below 180 at all times recommended.  Increase protein intake / consider protein supplements - see below  Elevate extremities at all times when sitting / laying down.    DIETARY MODIFICATIONS TO HELP WITH WOUND HEALING:    Protein: Meats, beans, eggs, milk and yogurt particularly Greek yogurt), tofu, soy nuts, soy protein products    Vitamin C: Citrus fruits and juices, strawberries, tomatoes, tomato juice, peppers, baked potatoes, spinach, broccoli, cauliflower, [place] sprouts, cabbage    Vitamin A: Dark green, leafy vegetables, orange or yellow vegetables, cantaloupe, fortified dairy products, liver, fortified cereals    Zinc: Fortified cereals, red meats, seafood    Consider Trent by [employer] (These are essential branch chain amino acids that help with tissue building and wound healing) and take 2 packets/day. you can order online at [employer] or [employer]    ADDITIONAL REMINDERS:    The treatment plan has been discussed at length with you and your provider. Follow all instructions carefully, it is very important. If you do not follow all instructions, you are at  risk of your wound not healing, infection, possible loss of limb and even end of life.  Please call the clinic during regular business hours ( 7:30 AM - 5:30 PM) if you notice increased bleeding, redness, warmth, pain or pus like drainage or start running a fever greater than 100.3.    For after hour emergencies, please call your primary physician or go to the nearest  emergency room.

## 2025-03-03 NOTE — PROGRESS NOTES
.Weekly Wound Education Note    Teaching Provided To: Patient  Training Topics: Off-loading;Cleasing and general instructions;Discharge instructions;Dressing;Edema control;Compression  Training Method: Explain/Verbal;Written  Training Response: Patient responds and understands            Callous debrided.  Honey gel to wound daily, cover with dry dressing.  New Darco shoe with peg insert customized.  Patient encouraged to wear at all times for offloading.

## 2025-03-10 ENCOUNTER — OFFICE VISIT (OUTPATIENT)
Dept: WOUND CARE | Facility: HOSPITAL | Age: 70
End: 2025-03-10
Attending: INTERNAL MEDICINE
Payer: MEDICARE

## 2025-03-10 VITALS
DIASTOLIC BLOOD PRESSURE: 60 MMHG | RESPIRATION RATE: 16 BRPM | HEART RATE: 79 BPM | TEMPERATURE: 98 F | SYSTOLIC BLOOD PRESSURE: 137 MMHG

## 2025-03-10 DIAGNOSIS — E66.01 CLASS 2 SEVERE OBESITY DUE TO EXCESS CALORIES WITH SERIOUS COMORBIDITY AND BODY MASS INDEX (BMI) OF 36.0 TO 36.9 IN ADULT (HCC): ICD-10-CM

## 2025-03-10 DIAGNOSIS — Z79.4 TYPE 2 DIABETES MELLITUS WITH HYPERGLYCEMIA, WITH LONG-TERM CURRENT USE OF INSULIN (HCC): ICD-10-CM

## 2025-03-10 DIAGNOSIS — I73.9 PERIPHERAL VASCULAR DISEASE, UNSPECIFIED: ICD-10-CM

## 2025-03-10 DIAGNOSIS — E11.65 TYPE 2 DIABETES MELLITUS WITH HYPERGLYCEMIA, WITH LONG-TERM CURRENT USE OF INSULIN (HCC): ICD-10-CM

## 2025-03-10 DIAGNOSIS — E66.812 CLASS 2 SEVERE OBESITY DUE TO EXCESS CALORIES WITH SERIOUS COMORBIDITY AND BODY MASS INDEX (BMI) OF 36.0 TO 36.9 IN ADULT (HCC): ICD-10-CM

## 2025-03-10 DIAGNOSIS — E11.621 DIABETIC ULCER OF LEFT MIDFOOT ASSOCIATED WITH TYPE 2 DIABETES MELLITUS, WITH FAT LAYER EXPOSED (HCC): ICD-10-CM

## 2025-03-10 DIAGNOSIS — L97.522 ULCER OF LEFT FOOT, WITH FAT LAYER EXPOSED (HCC): Primary | ICD-10-CM

## 2025-03-10 DIAGNOSIS — L97.422 DIABETIC ULCER OF LEFT MIDFOOT ASSOCIATED WITH TYPE 2 DIABETES MELLITUS, WITH FAT LAYER EXPOSED (HCC): ICD-10-CM

## 2025-03-10 DIAGNOSIS — R23.8 SLOUGHING OF WOUND: ICD-10-CM

## 2025-03-10 DIAGNOSIS — T14.8XXD DELAYED WOUND HEALING: ICD-10-CM

## 2025-03-10 DIAGNOSIS — N18.30 STAGE 3 CHRONIC KIDNEY DISEASE, UNSPECIFIED WHETHER STAGE 3A OR 3B CKD (HCC): ICD-10-CM

## 2025-03-10 LAB — GLUCOSE BLD-MCNC: 235 MG/DL (ref 70–99)

## 2025-03-10 PROCEDURE — 97597 DBRDMT OPN WND 1ST 20 CM/<: CPT | Performed by: INTERNAL MEDICINE

## 2025-03-10 PROCEDURE — 82962 GLUCOSE BLOOD TEST: CPT | Performed by: INTERNAL MEDICINE

## 2025-03-10 NOTE — PROGRESS NOTES
[facility] PROGRESS NOTE  MINERVA MARTÍNEZ MD  3/10/2025    Chief Complaint:   Chief Complaint   Patient presents with    Wound Recheck     Patient arrives on foot to wound care follow up appointment. She is wearing a leandro offloading shoe to her LLE.        HPI:   Subjective   Becky Mabry is a 69 year old female coming in for a follow-up visit.    HPI    Wound improved over all.   Dimensions improved.   Tissue quality improved.   However periwound callus +++  - this was pared today    No s/o infection.     She asked me to trim her toenails - this was done today as well.     She is walking a lot with her darco shoe.     Review of Systems  Negative except HPI   Denies chest pain / SOB / palpitations  Denies fever.     Allergies  Allergies[1]    Current Meds:  Current Outpatient Medications   Medication Sig Dispense Refill    Tirzepatide (MOUNJARO) 12.5 MG/0.5ML Subcutaneous Solution Auto-injector Inject 12.5 1e11 Vector Genomes/m2 into the skin once a week.      metOLazone 2.5 MG Oral Tab Take 1 tablet (2.5 mg total) by mouth daily.      apixaban 5 MG Oral Tab Take 2 tabs (10mg) by mouth twice daily for 7 days, then take 1 tab (5mg) by mouth twice daily thereafter. (Patient not taking: Reported on 3/3/2025) 74 tablet 0    tiZANidine 2 MG Oral Tab Take 1 tablet (2 mg total) by mouth every 6 (six) hours as needed.      FARXIGA 10 MG Oral Tab Take 1 tablet (10 mg total) by mouth daily.      TRULICITY 1.5 MG/0.5ML Subcutaneous Solution Pen-injector Inject into the skin once a week. (Patient not taking: Reported on 3/3/2025)      gabapentin 100 MG Oral Cap       levothyroxine 125 MCG Oral Tab Pt unsure of dose      Phentermine HCl 37.5 MG Oral Tab Take 1 tablet (37.5 mg total) by mouth daily as needed.      rOPINIRole 0.25 MG Oral Tab Take 4 tablets (1 mg total) by mouth at bedtime.      silver sulfADIAZINE 1 % External Cream       SUMAtriptan Succinate 100 MG Oral Tab TAKE 1 TABLET AS NEEDED ORALLY DAILY AS  NEEDED 30      traZODone 50 MG Oral Tab 1 TABLET AT BEDTIME AS NEEDED ORALLY ONCE A DAY 90      allopurinol 300 MG Oral Tab Take 1 tablet (300 mg total) by mouth daily.      carvedilol 3.125 MG Oral Tab Pt unsure of dose      LANTUS SOLOSTAR 100 UNIT/ML Subcutaneous Solution Pen-injector       ergocalciferol 1.25 MG (91404 UT) Oral Cap Take 1 capsule (50,000 Units total) by mouth once a week. (Patient not taking: Reported on 3/3/2025)  1    Potassium Chloride ER 10 MEQ Oral Tab CR Take 1 tablet (10 mEq total) by mouth 2 (two) times daily.      torsemide 20 MG Oral Tab Take 1 tablet (20 mg total) by mouth daily. 30 tablet 0    insulin glargine 100 UNIT/ML Subcutaneous Solution Inject 50 Units into the skin 2 (two) times daily. 1 pen 3    buPROPion HCl ER, XL, 300 MG Oral Tablet 24 Hr Take 1 tablet (300 mg total) by mouth daily.      Pravastatin Sodium 80 MG Oral Tab Take 1 tablet (80 mg total) by mouth nightly.      spironolactone 25 MG Oral Tab Take 1 tablet (25 mg total) by mouth daily.      Insulin Lispro, Human, (HUMALOG) 100 UNIT/ML Subcutaneous Solution Inject 10-15 Units into the skin 3 (three) times daily before meals.      cetirizine (ZYRTEC) 10 MG Oral Tab Take 1 tablet (10 mg total) by mouth daily.           EXAM:   Objective   Objective    Physical Exam    Vital Signs  Vitals:    03/10/25 1440   BP: 137/60   Pulse: 79   Resp: 16   Temp: 97.6 °F (36.4 °C)       Wound Assessment  Wound 03/03/25 #1 Left plantar foot Foot Left;Plantar (Active)   Date First Assessed/Time First Assessed: 03/03/25 1411    Wound Number (Wound Clinic Only): #1 Left plantar foot  Primary Wound Type: Diabetic Ulcer  Location: Foot  Wound Location Orientation: Left;Plantar      Assessments 3/3/2025  2:24 PM 3/10/2025  2:59 PM   Wound Image        Drainage Amount Scant --   Drainage Description Yellow;Serous --   Wound Length (cm) 0.8 cm --   Wound Width (cm) 0.8 cm --   Wound Surface Area (cm^2) 0.64 cm^2 --   Wound Depth (cm) 0.2 cm  --   Wound Volume (cm^3) 0.128 cm^3 --   Margins Well-defined edges --   Non-staged Wound Description Full thickness --   Jennifer-wound Assessment Dry;Callous --   Wound Granulation Tissue Spongy;Pink --   Wound Bed Granulation (%) 50 % (50% callous) --   Wound Odor None --   Shape 50% callous --   Tunneling? No --   Undermining? No --   Sinus Tracts? No --   Guevara Scale Grade 2 --       Active Orders   Date Order Priority Status Authorizing Provider   03/10/25 1504 Debridement Routine Active Rojas Robert MD       Inactive Orders   Date Order Priority Status Authorizing Provider   03/03/25 1636 Debridement Diabetic Ulcer Left;Plantar Foot Routine Completed Rojas Robert MD                ASSESSMENT AND PLAN:     Assessment     Encounter Diagnosis  1. Ulcer of left foot, with fat layer exposed ([facility])    2. Diabetic ulcer of left midfoot associated with type 2 diabetes mellitus, with fat layer exposed ([facility])    3. Sloughing of wound    4. Delayed wound healing    5. Type 2 diabetes mellitus with hyperglycemia, with long-term current use of insulin ([facility])    6. Class 2 severe obesity due to excess calories with serious comorbidity and body mass index (BMI) of 36.0 to 36.9 in adult ([facility])    7. Stage 3 chronic kidney disease, unspecified whether stage 3a or 3b CKD ([facility])    8. Peripheral vascular disease, unspecified        PROCEDURES:    Debridement Diabetic Ulcer Left;Plantar Foot   Wound 03/03/25 #1 Left plantar foot Foot Left;Plantar     Performed by: Rojas Robert MD  Authorized by: Rojas Robert MD       Consent   Consent obtained? verbal  Consent given by: patient  Risks discussed? procedural risks discussed     Debridement Details  Performed by: physician  Debridement type: conservative sharp  Pain control: lidocaine 4%  Pain control administration type: topical     Pre-debridement measurements  Length (cm): 0.2  Width (cm): 0.2  Depth (cm): 0.2  Surface Area (cm^2): 0.04      Post-debridement measurements  Length (cm): 0.2  Width (cm): 0.2  Depth (cm): 0.2  Percent debrided: 100%  Surface Area (cm^2): 0.04  Area Debrided (cm^2): 0.04  Volume (cm^3): 0.01     Devitalized tissue debrided: biofilm and callus  Instrument(s) utilized: blade  Comment regarding bleeding: minimal  Hemostasis obtained with: pressure  Procedural pain (0-10): 0  Post-procedural pain: 0   Response to treatment: procedure was tolerated well           Toenail trimming done b/l with nail nipper.       PLAN OF CARE:    Continue honey gel  Intense offloading needed - counseled  Obtain forefoot offloading orthoses.   Better BS control needed  Get TBI done.   Get labs done.   Toenail trimming done.   Watch out for signs of early infection - counseled.   Plan of care discussed with patient in detail - All questions answered   Return in two week.     Orders  Orders Placed This Encounter   Procedures    Debridement       Meds & Refills for this Visit:  Requested Prescriptions      No prescriptions requested or ordered in this encounter         Patient Instructions     Return two weeks    Wound Cleaning and Dressings:    Wash your hands with soap and water. Always wear gloves while changing dressings. Donot touch wound / lizbeth-wound skin with un-gloved hands. Remove old dressing, discard and place into trash.      DRESSINGS: honey gel / gauze  Change dressing daily.  Wear spandagrip    Off-Loading:  Get FOREFOOT OFFLOADING ORTHOSES BOOT   Darco shoe with PEg insert- for now.     Miscellaneous Instructions:  Supplement with a daily multivitamin   Low salt diet  Intense blood sugar control - Goal Blood sugar below 180 at all times recommended.  Increase protein intake / consider protein supplements - see below  Elevate extremities at all times when sitting / laying down.    DIETARY MODIFICATIONS TO HELP WITH WOUND HEALING:    Protein: Meats, beans, eggs, milk and yogurt particularly Greek yogurt), tofu, soy nuts, soy protein  products    Vitamin C: Citrus fruits and juices, strawberries, tomatoes, tomato juice, peppers, baked potatoes, spinach, broccoli, cauliflower, [place] sprouts, cabbage    Vitamin A: Dark green, leafy vegetables, orange or yellow vegetables, cantaloupe, fortified dairy products, liver, fortified cereals    Zinc: Fortified cereals, red meats, seafood    Consider Trent by [employer] (These are essential branch chain amino acids that help with tissue building and wound healing) and take 2 packets/day. you can order online at [employer] or [employer]    ADDITIONAL REMINDERS:    The treatment plan has been discussed at length with you and your provider. Follow all instructions carefully, it is very important. If you do not follow all instructions, you are at  risk of your wound not healing, infection, possible loss of limb and even end of life.  Please call the clinic during regular business hours ( 7:30 AM - 5:30 PM) if you notice increased bleeding, redness, warmth, pain or pus like drainage or start running a fever greater than 100.3.    For after hour emergencies, please call your primary physician or go to the nearest emergency room.        Patient/Caregiver Education: There are no barriers to learning. Medical education for above diagnosis given.   Answered all questions.    Outcome: Patient verbalizes understanding. Patient is notified to call with any questions, complications, allergies, or worsening or changing symptoms.  Patient is to call with any side effects or complications as a result of the treatments today.      DOCUMENTATION OF TIME SPENT: Code selection for this visit was based on time spent : 30 min on date of service in preparing to see the patient, obtaining and/or reviewing separately obtained history, performing a medically appropriate examination, counseling and educating the patient/family/caregiver, ordering medications or testing, referring and communicating with other healthcare providers,  documenting clinical information in the E HR, independently interpreting results and communicating results to the patient/family/caregiver and care coordination with the patient's other providers.    Followup: Return in about 2 weeks (around 3/24/2025) for Wound followup.      Note to Patient:  The 21st Century Cures Act makes medical notes like these available to patients in the interest of transparency. However, be advised this is a medical document and is intended as [id number] communication; it is written in medical language and may appear blunt, direct, or contain abbreviations or verbiage that are unfamiliar. Medical documents are intended to carry relevant information, facts as evident, and the clinical opinion of the practitioner.    Also, please note that this report has been produced using speech recognition software and may contain errors related to that system including, but not limited to, errors in grammar, punctuation, and spelling, as well as words and phrases that possibly may have been recognized inappropriately.  If there are any questions or concerns, contact the dictating provider for clarification.      Rojas Short MD  3/10/2025  3:04 PM                      [1] No Known Allergies

## 2025-03-10 NOTE — PROGRESS NOTES
Patient ID: Becky Mabry is a 69 year old female.    Debridement Diabetic Ulcer Left;Plantar Foot   Wound 03/03/25 #1 Left plantar foot Foot Left;Plantar    Performed by: Rojas Robert MD  Authorized by: Rojas Robert MD      Consent   Consent obtained? verbal  Consent given by: patient  Risks discussed? procedural risks discussed    Debridement Details  Performed by: physician  Debridement type: conservative sharp  Pain control: lidocaine 4%  Pain control administration type: topical    Pre-debridement measurements  Length (cm): 0.2  Width (cm): 0.2  Depth (cm): 0.2  Surface Area (cm^2): 0.04    Post-debridement measurements  Length (cm): 0.2  Width (cm): 0.2  Depth (cm): 0.2  Percent debrided: 100%  Surface Area (cm^2): 0.04  Area Debrided (cm^2): 0.04  Volume (cm^3): 0.01    Devitalized tissue debrided: biofilm and callus  Instrument(s) utilized: blade  Comment regarding bleeding: minimal  Hemostasis obtained with: pressure  Procedural pain (0-10): 0  Post-procedural pain: 0   Response to treatment: procedure was tolerated well

## 2025-03-10 NOTE — PATIENT INSTRUCTIONS
Return two weeks    Wound Cleaning and Dressings:    Wash your hands with soap and water. Always wear gloves while changing dressings. Donot touch wound / lizbeth-wound skin with un-gloved hands. Remove old dressing, discard and place into trash.      DRESSINGS: honey gel / gauze  Change dressing daily.  Wear spandagrip    Off-Loading:  Get FOREFOOT OFFLOADING ORTHOSES BOOT   Darco shoe with PEg insert- for now.       Miscellaneous Instructions:  Supplement with a daily multivitamin   Low salt diet  Intense blood sugar control - Goal Blood sugar below 180 at all times recommended.  Increase protein intake / consider protein supplements - see below  Elevate extremities at all times when sitting / laying down.    DIETARY MODIFICATIONS TO HELP WITH WOUND HEALING:    Protein: Meats, beans, eggs, milk and yogurt particularly Greek yogurt), tofu, soy nuts, soy protein products    Vitamin C: Citrus fruits and juices, strawberries, tomatoes, tomato juice, peppers, baked potatoes, spinach, broccoli, cauliflower, [place] sprouts, cabbage    Vitamin A: Dark green, leafy vegetables, orange or yellow vegetables, cantaloupe, fortified dairy products, liver, fortified cereals    Zinc: Fortified cereals, red meats, seafood    Consider Trent by [employer] (These are essential branch chain amino acids that help with tissue building and wound healing) and take 2 packets/day. you can order online at [employer] or [employer]    ADDITIONAL REMINDERS:    The treatment plan has been discussed at length with you and your provider. Follow all instructions carefully, it is very important. If you do not follow all instructions, you are at  risk of your wound not healing, infection, possible loss of limb and even end of life.  Please call the clinic during regular business hours ( 7:30 AM - 5:30 PM) if you notice increased bleeding, redness, warmth, pain or pus like drainage or start running a fever greater than 100.3.    For after hour emergencies,  It appears that all of the steroid opthalmic ointments are expensive. Tell her it is ok to use the  otc hydrocortisone 1% oint to area 1-2x/day until better. Would recommend restricting use to 1 week to avoid side effects.   If not better at that point, pl please call your primary physician or go to the nearest emergency room.

## 2025-03-10 NOTE — PROGRESS NOTES
Weekly Wound Education Note    Teaching Provided To: Patient  Training Topics: Cleasing and general instructions, Discharge instructions, Dressing, Off-loading, Foot wear  Training Method: Explain/Verbal  Training Response: Patient responds and understands, Reinforcement needed        Notes: Stable. Discuss the importance of offloading due to noted an increase in callous built up around the wound - provided information on forfoot offloading shoe. Also reminded her provider had ordered labs and TBI - informed her the lab here does walkins but she would need to call central scheduling for the ultasound. She verablized understanding. Continue honey gel, bordered gauze, and dacro shoe with peg liner.

## 2025-03-17 ENCOUNTER — APPOINTMENT (OUTPATIENT)
Dept: WOUND CARE | Facility: HOSPITAL | Age: 70
End: 2025-03-17
Attending: INTERNAL MEDICINE
Payer: MEDICARE

## 2025-03-24 ENCOUNTER — HOSPITAL ENCOUNTER (OUTPATIENT)
Dept: LAB | Facility: HOSPITAL | Age: 70
Discharge: HOME | End: 2025-03-24
Attending: INTERNAL MEDICINE
Payer: MEDICARE

## 2025-03-24 ENCOUNTER — APPOINTMENT (OUTPATIENT)
Dept: WOUND CARE | Facility: HOSPITAL | Age: 70
End: 2025-03-24
Attending: INTERNAL MEDICINE
Payer: MEDICARE

## 2025-03-24 ENCOUNTER — HOSPITAL ENCOUNTER (OUTPATIENT)
Dept: GENERAL RADIOLOGY | Facility: HOSPITAL | Age: 70
Discharge: HOME | End: 2025-03-24
Attending: INTERNAL MEDICINE
Payer: MEDICARE

## 2025-03-24 VITALS
TEMPERATURE: 98 F | SYSTOLIC BLOOD PRESSURE: 138 MMHG | HEART RATE: 79 BPM | DIASTOLIC BLOOD PRESSURE: 70 MMHG | RESPIRATION RATE: 16 BRPM

## 2025-03-24 DIAGNOSIS — T14.8XXD DELAYED WOUND HEALING: ICD-10-CM

## 2025-03-24 DIAGNOSIS — L97.522 ULCER OF LEFT FOOT, WITH FAT LAYER EXPOSED (HCC): ICD-10-CM

## 2025-03-24 DIAGNOSIS — L97.422 DIABETIC ULCER OF LEFT MIDFOOT ASSOCIATED WITH TYPE 2 DIABETES MELLITUS, WITH FAT LAYER EXPOSED (HCC): ICD-10-CM

## 2025-03-24 DIAGNOSIS — E11.621 DIABETIC ULCER OF LEFT MIDFOOT ASSOCIATED WITH TYPE 2 DIABETES MELLITUS, WITH FAT LAYER EXPOSED (HCC): ICD-10-CM

## 2025-03-24 DIAGNOSIS — L97.522 ULCER OF LEFT FOOT, WITH FAT LAYER EXPOSED (HCC): Primary | ICD-10-CM

## 2025-03-24 DIAGNOSIS — N18.30 STAGE 3 CHRONIC KIDNEY DISEASE, UNSPECIFIED WHETHER STAGE 3A OR 3B CKD (HCC): ICD-10-CM

## 2025-03-24 DIAGNOSIS — I73.9 PERIPHERAL VASCULAR DISEASE, UNSPECIFIED: ICD-10-CM

## 2025-03-24 DIAGNOSIS — E66.812 CLASS 2 SEVERE OBESITY DUE TO EXCESS CALORIES WITH SERIOUS COMORBIDITY AND BODY MASS INDEX (BMI) OF 36.0 TO 36.9 IN ADULT (HCC): ICD-10-CM

## 2025-03-24 DIAGNOSIS — E66.01 CLASS 2 SEVERE OBESITY DUE TO EXCESS CALORIES WITH SERIOUS COMORBIDITY AND BODY MASS INDEX (BMI) OF 36.0 TO 36.9 IN ADULT (HCC): ICD-10-CM

## 2025-03-24 DIAGNOSIS — L08.9 INFECTED WOUND: ICD-10-CM

## 2025-03-24 DIAGNOSIS — Z79.4 TYPE 2 DIABETES MELLITUS WITH HYPERGLYCEMIA, WITH LONG-TERM CURRENT USE OF INSULIN (HCC): ICD-10-CM

## 2025-03-24 DIAGNOSIS — R23.8 SLOUGHING OF WOUND: ICD-10-CM

## 2025-03-24 DIAGNOSIS — T14.8XXA INFECTED WOUND: ICD-10-CM

## 2025-03-24 DIAGNOSIS — E11.65 TYPE 2 DIABETES MELLITUS WITH HYPERGLYCEMIA, WITH LONG-TERM CURRENT USE OF INSULIN (HCC): ICD-10-CM

## 2025-03-24 LAB
ALBUMIN SERPL-MCNC: 4.9 G/DL (ref 3.2–4.8)
ALBUMIN/GLOB SERPL: 1.2 {RATIO} (ref 1–2)
ALP LIVER SERPL-CCNC: 317 U/L
ALT SERPL-CCNC: 38 U/L
ANION GAP SERPL CALC-SCNC: 10 MMOL/L (ref 0–18)
AST SERPL-CCNC: 27 U/L (ref ?–34)
BASOPHILS # BLD AUTO: 0.07 X10(3) UL (ref 0–0.2)
BASOPHILS NFR BLD AUTO: 0.7 %
BILIRUB SERPL-MCNC: 0.4 MG/DL (ref 0.2–1.1)
BUN BLD-MCNC: 76 MG/DL (ref 9–23)
CALCIUM BLD-MCNC: 9.9 MG/DL (ref 8.7–10.6)
CHLORIDE SERPL-SCNC: 108 MMOL/L (ref 98–112)
CO2 SERPL-SCNC: 23 MMOL/L (ref 21–32)
CREAT BLD-MCNC: 2.22 MG/DL
CRP SERPL-MCNC: 2 MG/DL (ref ?–0.5)
EGFRCR SERPLBLD CKD-EPI 2021: 23 ML/MIN/1.73M2 (ref 60–?)
EOSINOPHIL # BLD AUTO: 0.64 X10(3) UL (ref 0–0.7)
EOSINOPHIL NFR BLD AUTO: 6 %
ERYTHROCYTE [DISTWIDTH] IN BLOOD BY AUTOMATED COUNT: 14.9 %
ERYTHROCYTE [SEDIMENTATION RATE] IN BLOOD: 105 MM/HR
EST. AVERAGE GLUCOSE BLD GHB EST-MCNC: 220 MG/DL (ref 68–126)
FASTING STATUS PATIENT QL REPORTED: YES
GLOBULIN PLAS-MCNC: 4.1 G/DL (ref 2–3.5)
GLUCOSE BLD-MCNC: 167 MG/DL (ref 70–99)
GLUCOSE BLD-MCNC: 171 MG/DL (ref 70–99)
HBA1C MFR BLD: 9.3 % (ref ?–5.7)
HCT VFR BLD AUTO: 38.7 %
HGB BLD-MCNC: 12.7 G/DL
IMM GRANULOCYTES # BLD AUTO: 0.04 X10(3) UL (ref 0–1)
IMM GRANULOCYTES NFR BLD: 0.4 %
LYMPHOCYTES # BLD AUTO: 2.39 X10(3) UL (ref 1–4)
LYMPHOCYTES NFR BLD AUTO: 22.6 %
MCH RBC QN AUTO: 29.3 PG (ref 26–34)
MCHC RBC AUTO-ENTMCNC: 32.8 G/DL (ref 31–37)
MCV RBC AUTO: 89.4 FL
MONOCYTES # BLD AUTO: 0.9 X10(3) UL (ref 0.1–1)
MONOCYTES NFR BLD AUTO: 8.5 %
NEUTROPHILS # BLD AUTO: 6.54 X10 (3) UL (ref 1.5–7.7)
NEUTROPHILS # BLD AUTO: 6.54 X10(3) UL (ref 1.5–7.7)
NEUTROPHILS NFR BLD AUTO: 61.8 %
OSMOLALITY SERPL CALC.SUM OF ELEC: 318 MOSM/KG (ref 275–295)
PLATELET # BLD AUTO: 344 10(3)UL (ref 150–450)
POTASSIUM SERPL-SCNC: 4.2 MMOL/L (ref 3.5–5.1)
PREALBUMIN: 24.4 MG/DL
PREALBUMIN: 24.4 MG/DL
PROT SERPL-MCNC: 9 G/DL (ref 5.7–8.2)
RBC # BLD AUTO: 4.33 X10(6)UL
SODIUM SERPL-SCNC: 141 MMOL/L (ref 136–145)
WBC # BLD AUTO: 10.6 X10(3) UL (ref 4–11)

## 2025-03-24 PROCEDURE — 87070 CULTURE OTHR SPECIMN AEROBIC: CPT | Performed by: INTERNAL MEDICINE

## 2025-03-24 PROCEDURE — 83036 HEMOGLOBIN GLYCOSYLATED A1C: CPT

## 2025-03-24 PROCEDURE — 85025 COMPLETE CBC W/AUTO DIFF WBC: CPT

## 2025-03-24 PROCEDURE — 73630 X-RAY EXAM OF FOOT: CPT | Performed by: INTERNAL MEDICINE

## 2025-03-24 PROCEDURE — 99214 OFFICE O/P EST MOD 30 MIN: CPT

## 2025-03-24 PROCEDURE — 84134 ASSAY OF PREALBUMIN: CPT

## 2025-03-24 PROCEDURE — 36415 COLL VENOUS BLD VENIPUNCTURE: CPT

## 2025-03-24 PROCEDURE — 80053 COMPREHEN METABOLIC PANEL: CPT

## 2025-03-24 PROCEDURE — 86140 C-REACTIVE PROTEIN: CPT

## 2025-03-24 PROCEDURE — 85652 RBC SED RATE AUTOMATED: CPT

## 2025-03-24 PROCEDURE — 82962 GLUCOSE BLOOD TEST: CPT | Performed by: INTERNAL MEDICINE

## 2025-03-24 RX ORDER — GENTAMICIN SULFATE 1 MG/G
1 OINTMENT TOPICAL 3 TIMES DAILY
Qty: 30 G | Refills: 3 | Status: SHIPPED | OUTPATIENT
Start: 2025-03-24

## 2025-03-24 NOTE — PROGRESS NOTES
[facility] PROGRESS NOTE  MINERVA MARTÍNEZ MD  3/24/2025    Chief Complaint:   Chief Complaint   Patient presents with    Wound Care     Patient here for follow up wound care visit to left plantar foot. New wounds to left 2nd toe plantar and left great toe. Patient states she noticed wounds shortly after last appointment to clinic. Patient denies any pain in wounds at this time.       HPI:   Subjective   Becky Mabry is a 69 year old female coming in for a follow-up visit.    HPI    The original wound that she came to see me for on the plantar left foot is now closed with some callus which was pared today.      She has 2 new wounds-1 on left great toe and the other 1 left second toe.  Etiology of this wound is not clear-she thinks she could have bumped it somewhere.  There is malodor and a significant nonviable/necrotic tissue.  She does have an offloading shoe.    Labs not yet done.   TBI not yet done.   These were ordered last visit      Last A1c ?    Review of Systems  Negative except HPI   Denies chest pain / SOB / palpitations  Denies fever.     Allergies  Allergies[1]    Current Meds:  Current Outpatient Medications   Medication Sig Dispense Refill    gentamicin 0.1 % External Ointment Apply 1 Application topically 3 (three) times daily. 30 g 3    Tirzepatide (MOUNJARO) 12.5 MG/0.5ML Subcutaneous Solution Auto-injector Inject 12.5 1e11 Vector Genomes/m2 into the skin once a week.      metOLazone 2.5 MG Oral Tab Take 1 tablet (2.5 mg total) by mouth daily.      apixaban 5 MG Oral Tab Take 2 tabs (10mg) by mouth twice daily for 7 days, then take 1 tab (5mg) by mouth twice daily thereafter. (Patient not taking: Reported on 3/3/2025) 74 tablet 0    tiZANidine 2 MG Oral Tab Take 1 tablet (2 mg total) by mouth every 6 (six) hours as needed.      FARXIGA 10 MG Oral Tab Take 1 tablet (10 mg total) by mouth daily.      TRULICITY 1.5 MG/0.5ML Subcutaneous Solution Pen-injector Inject into the skin once a  week. (Patient not taking: Reported on 3/3/2025)      gabapentin 100 MG Oral Cap       levothyroxine 125 MCG Oral Tab Pt unsure of dose      Phentermine HCl 37.5 MG Oral Tab Take 1 tablet (37.5 mg total) by mouth daily as needed.      rOPINIRole 0.25 MG Oral Tab Take 4 tablets (1 mg total) by mouth at bedtime.      silver sulfADIAZINE 1 % External Cream       SUMAtriptan Succinate 100 MG Oral Tab TAKE 1 TABLET AS NEEDED ORALLY DAILY AS NEEDED 30      traZODone 50 MG Oral Tab 1 TABLET AT BEDTIME AS NEEDED ORALLY ONCE A DAY 90      allopurinol 300 MG Oral Tab Take 1 tablet (300 mg total) by mouth daily.      carvedilol 3.125 MG Oral Tab Pt unsure of dose      LANTUS SOLOSTAR 100 UNIT/ML Subcutaneous Solution Pen-injector       ergocalciferol 1.25 MG (91016 UT) Oral Cap Take 1 capsule (50,000 Units total) by mouth once a week. (Patient not taking: Reported on 3/3/2025)  1    Potassium Chloride ER 10 MEQ Oral Tab CR Take 1 tablet (10 mEq total) by mouth 2 (two) times daily.      torsemide 20 MG Oral Tab Take 1 tablet (20 mg total) by mouth daily. 30 tablet 0    insulin glargine 100 UNIT/ML Subcutaneous Solution Inject 50 Units into the skin 2 (two) times daily. 1 pen 3    buPROPion HCl ER, XL, 300 MG Oral Tablet 24 Hr Take 1 tablet (300 mg total) by mouth daily.      Pravastatin Sodium 80 MG Oral Tab Take 1 tablet (80 mg total) by mouth nightly.      spironolactone 25 MG Oral Tab Take 1 tablet (25 mg total) by mouth daily.      Insulin Lispro, Human, (HUMALOG) 100 UNIT/ML Subcutaneous Solution Inject 10-15 Units into the skin 3 (three) times daily before meals.      cetirizine (ZYRTEC) 10 MG Oral Tab Take 1 tablet (10 mg total) by mouth daily.           EXAM:   Objective   Objective    Physical Exam    Vital Signs  Vitals:    03/24/25 0700   BP: 138/70   Pulse: 79   Resp: 16   Temp: 97.9 °F (36.6 °C)       Wound Assessment  Wound 03/03/25 #1 Left plantar foot Foot Left;Plantar (Active)   Date First Assessed/Time First  Assessed: 03/03/25 1411    Wound Number (Wound Clinic Only): #1 Left plantar foot  Primary Wound Type: Diabetic Ulcer  Location: Foot  Wound Location Orientation: Left;Plantar      Assessments 3/3/2025  2:24 PM 3/24/2025  9:46 AM   Wound Image        Drainage Amount Scant None   Drainage Description Yellow;Serous --   Wound Length (cm) 0.8 cm 0.1 cm   Wound Width (cm) 0.8 cm 0.1 cm   Wound Surface Area (cm^2) 0.64 cm^2 0.01 cm^2   Wound Depth (cm) 0.2 cm 0 cm   Wound Volume (cm^3) 0.128 cm^3 0 cm^3   Wound Healing % -- 100   Margins Well-defined edges Well-defined edges   Non-staged Wound Description Full thickness Full thickness   Jennifer-wound Assessment Dry;Callous Dry;Callous   Wound Granulation Tissue Spongy;Pink --   Wound Bed Granulation (%) 50 % (50% callous) --   Wound Odor None None   Shape 50% callous 100% callous   Tunneling? No --   Undermining? No --   Sinus Tracts? No --   Guevara Scale Grade 2 Grade 2       Inactive Orders   Date Order Priority Status Authorizing Provider   03/10/25 1504 Debridement Diabetic Ulcer Left;Plantar Foot Routine Completed Rojas Robert MD   03/03/25 1636 Debridement Diabetic Ulcer Left;Plantar Foot Routine Completed Rojas Robert MD       Wound 03/24/25 #2 Left 2nd Toe Toe (Comment which one) Left (Active)   Date First Assessed: 03/24/25    Wound Number (Wound Clinic Only): #2 Left 2nd Toe  Primary Wound Type: Diabetic Ulcer  Location: Toe (Comment which one)  Wound Location Orientation: Left      Assessments 3/24/2025  9:46 AM   Wound Image     Drainage Amount Moderate   Drainage Description Serosanguineous   Wound Length (cm) 2.6 cm   Wound Width (cm) 1.5 cm   Wound Surface Area (cm^2) 3.9 cm^2   Wound Depth (cm) 0.1 cm   Wound Volume (cm^3) 0.39 cm^3   Margins Well-defined edges   Non-staged Wound Description Full thickness   Jennifer-wound Assessment Moist;Maceration;Dry;Edema;Callous   Wound Granulation Tissue Pink;Firm;Pale Grey   Wound Bed Granulation (%) 50 %    Wound Bed Epithelium (%) 25 %   Wound Bed Slough (%) 25 %   Wound Odor None   Shape bridged   Guevara Scale Grade 2       No associated orders.       Wound 03/24/25 #3 Left Great Toe Left (Active)   Date First Assessed/Time First Assessed: 03/24/25 0942    Wound Number (Wound Clinic Only): #3 Left Great Toe  Primary Wound Type: Diabetic Ulcer  Wound Location Orientation: Left      Assessments 3/24/2025  9:48 AM   Wound Image      Drainage Amount Small   Drainage Description Serosanguineous   Wound Length (cm) 4.7 cm   Wound Width (cm) 3.8 cm   Wound Surface Area (cm^2) 17.86 cm^2   Wound Depth (cm) 0.1 cm   Wound Volume (cm^3) 1.786 cm^3   Margins Well-defined edges   Non-staged Wound Description Full thickness   Jennifer-wound Assessment Moist;Maceration;Edema   Wound Granulation Tissue Pink;Firm;Pale Grey   Wound Bed Granulation (%) 20 %   Wound Bed Epithelium (%) 10 %   Wound Bed Slough (%) 30 %   Wound Odor None   Shape 40% Callous   Guevara Scale Grade 2       No associated orders.          Vascular exam-monophasic/biphasic dorsalis pedis pulse left via Doppler.      ASSESSMENT AND PLAN:     Assessment     Encounter Diagnosis  1. Ulcer of left foot, with fat layer exposed ([facility])    2. Diabetic ulcer of left midfoot associated with type 2 diabetes mellitus, with fat layer exposed ([facility])    3. Sloughing of wound    4. Delayed wound healing    5. Type 2 diabetes mellitus with hyperglycemia, with long-term current use of insulin ([facility])    6. Class 2 severe obesity due to excess calories with serious comorbidity and body mass index (BMI) of 36.0 to 36.9 in adult ([facility])    7. Stage 3 chronic kidney disease, unspecified whether stage 3a or 3b CKD ([facility])    8. Peripheral vascular disease, unspecified    9. Infected wound      PROCEDURES:    Callus paring of Plantar ulcer done.  Ultrasound      PLAN OF CARE:    Wound culture.  Start empiric gentamicin for treatment of wound infection for the toe wounds.  Check labs-order placed  last visit  Check x-ray of the left foot for infected wounds  Ultrasound TBI to evaluate blood flow  Will consult podiatry/vascular MD based on the above test results  Intense blood sugar control  Intense offloading  Use Darco shoe when needs to ambulate.  Watch out for signs of early infection - counseled.   Plan of care discussed with patient in detail - All questions answered   Return in one week.       Meds & Refills for this Visit:  Requested Prescriptions     Signed Prescriptions Disp Refills    gentamicin 0.1 % External Ointment 30 g 3     Sig: Apply 1 Application topically 3 (three) times daily.         Patient Instructions     Return 1 week  Xray foot  Check labs  Check TBI    Wound Cleaning and Dressings:    Wash your hands with soap and water. Always wear gloves while changing dressings. Donot touch wound / lizbeth-wound skin with un-gloved hands. Remove old dressing, discard and place into trash.      DRESSINGS: gentamicin ointment / gauze  Change dressing daily.  Wear spandagrip    Off-Loading:  Get FOREFOOT OFFLOADING ORTHOSES BOOT   Darco shoe with PEg insert- for now.       Miscellaneous Instructions:  Supplement with a daily multivitamin   Low salt diet  Intense blood sugar control - Goal Blood sugar below 180 at all times recommended.  Increase protein intake / consider protein supplements - see below  Elevate extremities at all times when sitting / laying down.    DIETARY MODIFICATIONS TO HELP WITH WOUND HEALING:    Protein: Meats, beans, eggs, milk and yogurt particularly Greek yogurt), tofu, soy nuts, soy protein products    Vitamin C: Citrus fruits and juices, strawberries, tomatoes, tomato juice, peppers, baked potatoes, spinach, broccoli, cauliflower, [place] sprouts, cabbage    Vitamin A: Dark green, leafy vegetables, orange or yellow vegetables, cantaloupe, fortified dairy products, liver, fortified cereals    Zinc: Fortified cereals, red meats, seafood    Consider Trent by [facility] (These  are essential branch chain amino acids that help with tissue building and wound healing) and take 2 packets/day. you can order online at [employer] or [employer]    ADDITIONAL REMINDERS:    The treatment plan has been discussed at length with you and your provider. Follow all instructions carefully, it is very important. If you do not follow all instructions, you are at  risk of your wound not healing, infection, possible loss of limb and even end of life.  Please call the clinic during regular business hours ( 7:30 AM - 5:30 PM) if you notice increased bleeding, redness, warmth, pain or pus like drainage or start running a fever greater than 100.3.    For after hour emergencies, please call your primary physician or go to the nearest emergency room.      Patient/Caregiver Education: There are no barriers to learning. Medical education for above diagnosis given.   Answered all questions.    Outcome: Patient verbalizes understanding. Patient is notified to call with any questions, complications, allergies, or worsening or changing symptoms.  Patient is to call with any side effects or complications as a result of the treatments today.      DOCUMENTATION OF TIME SPENT: Code selection for this visit was based on time spent : 40 min on date of service in preparing to see the patient, obtaining and/or reviewing separately obtained history, performing a medically appropriate examination, counseling and educating the patient/family/caregiver, ordering medications or testing, referring and communicating with other healthcare providers, documenting clinical information in the E HR, independently interpreting results and communicating results to the patient/family/caregiver and care coordination with the patient's other providers.    Followup: Return in about 1 week (around 3/31/2025) for Wound followup.      Note to Patient:  The 21st Century Cures Act makes medical notes like these available to patients in the interest of  transparency. However, be advised this is a medical document and is intended as [id number] communication; it is written in medical language and may appear blunt, direct, or contain abbreviations or verbiage that are unfamiliar. Medical documents are intended to carry relevant information, facts as evident, and the clinical opinion of the practitioner.    Also, please note that this report has been produced using speech recognition software and may contain errors related to that system including, but not limited to, errors in grammar, punctuation, and spelling, as well as words and phrases that possibly may have been recognized inappropriately.  If there are any questions or concerns, contact the dictating provider for clarification.      Rojas Short MD, 03/24/25, 10:37 AM                        [1] No Known Allergies

## 2025-03-24 NOTE — PATIENT INSTRUCTIONS
Return 1 week  Xray foot  Check labs  Check TBI    Wound Cleaning and Dressings:    Wash your hands with soap and water. Always wear gloves while changing dressings. Donot touch wound / lizbeth-wound skin with un-gloved hands. Remove old dressing, discard and place into trash.      DRESSINGS: gentamicin ointment / gauze  Change dressing daily.  Wear spandagrip    Off-Loading:  Get FOREFOOT OFFLOADING ORTHOSES BOOT   Darco shoe with PEg insert- for now.       Miscellaneous Instructions:  Supplement with a daily multivitamin   Low salt diet  Intense blood sugar control - Goal Blood sugar below 180 at all times recommended.  Increase protein intake / consider protein supplements - see below  Elevate extremities at all times when sitting / laying down.    DIETARY MODIFICATIONS TO HELP WITH WOUND HEALING:    Protein: Meats, beans, eggs, milk and yogurt particularly Greek yogurt), tofu, soy nuts, soy protein products    Vitamin C: Citrus fruits and juices, strawberries, tomatoes, tomato juice, peppers, baked potatoes, spinach, broccoli, cauliflower, [place] sprouts, cabbage    Vitamin A: Dark green, leafy vegetables, orange or yellow vegetables, cantaloupe, fortified dairy products, liver, fortified cereals    Zinc: Fortified cereals, red meats, seafood    Consider Trent by [employer] (These are essential branch chain amino acids that help with tissue building and wound healing) and take 2 packets/day. you can order online at [email or web address] or [email or web address]    ADDITIONAL REMINDERS:    The treatment plan has been discussed at length with you and your provider. Follow all instructions carefully, it is very important. If you do not follow all instructions, you are at  risk of your wound not healing, infection, possible loss of limb and even end of life.  Please call the clinic during regular business hours ( 7:30 AM - 5:30 PM) if you notice increased bleeding, redness, warmth, pain or pus like drainage or start running a fever greater  than 100.3.    For after hour emergencies, please call your primary physician or go to the nearest emergency room.

## 2025-03-26 NOTE — PROGRESS NOTES
Attempted to call pt, no answer, non-identifiable VM/ Left VM to call clinic back and provided number.

## 2025-03-31 ENCOUNTER — OFFICE VISIT (OUTPATIENT)
Dept: WOUND CARE | Facility: HOSPITAL | Age: 70
End: 2025-03-31
Attending: INTERNAL MEDICINE
Payer: MEDICARE

## 2025-03-31 VITALS
RESPIRATION RATE: 16 BRPM | DIASTOLIC BLOOD PRESSURE: 75 MMHG | HEART RATE: 73 BPM | TEMPERATURE: 98 F | SYSTOLIC BLOOD PRESSURE: 152 MMHG

## 2025-03-31 DIAGNOSIS — L97.522 ULCER OF LEFT FOOT, WITH FAT LAYER EXPOSED (HCC): Primary | ICD-10-CM

## 2025-03-31 DIAGNOSIS — Z79.4 TYPE 2 DIABETES MELLITUS WITH HYPERGLYCEMIA, WITH LONG-TERM CURRENT USE OF INSULIN (HCC): ICD-10-CM

## 2025-03-31 DIAGNOSIS — N18.30 STAGE 3 CHRONIC KIDNEY DISEASE, UNSPECIFIED WHETHER STAGE 3A OR 3B CKD (HCC): ICD-10-CM

## 2025-03-31 DIAGNOSIS — E11.65 TYPE 2 DIABETES MELLITUS WITH HYPERGLYCEMIA, WITH LONG-TERM CURRENT USE OF INSULIN (HCC): ICD-10-CM

## 2025-03-31 DIAGNOSIS — T14.8XXD DELAYED WOUND HEALING: ICD-10-CM

## 2025-03-31 DIAGNOSIS — E11.621 DIABETIC ULCER OF LEFT MIDFOOT ASSOCIATED WITH TYPE 2 DIABETES MELLITUS, WITH FAT LAYER EXPOSED (HCC): ICD-10-CM

## 2025-03-31 DIAGNOSIS — E66.812 CLASS 2 SEVERE OBESITY DUE TO EXCESS CALORIES WITH SERIOUS COMORBIDITY AND BODY MASS INDEX (BMI) OF 36.0 TO 36.9 IN ADULT (HCC): ICD-10-CM

## 2025-03-31 DIAGNOSIS — R23.8 SLOUGHING OF WOUND: ICD-10-CM

## 2025-03-31 DIAGNOSIS — L97.422 DIABETIC ULCER OF LEFT MIDFOOT ASSOCIATED WITH TYPE 2 DIABETES MELLITUS, WITH FAT LAYER EXPOSED (HCC): ICD-10-CM

## 2025-03-31 DIAGNOSIS — I73.9 PERIPHERAL VASCULAR DISEASE, UNSPECIFIED: ICD-10-CM

## 2025-03-31 DIAGNOSIS — E66.01 CLASS 2 SEVERE OBESITY DUE TO EXCESS CALORIES WITH SERIOUS COMORBIDITY AND BODY MASS INDEX (BMI) OF 36.0 TO 36.9 IN ADULT (HCC): ICD-10-CM

## 2025-03-31 LAB — GLUCOSE BLD-MCNC: 165 MG/DL (ref 70–99)

## 2025-03-31 PROCEDURE — 82962 GLUCOSE BLOOD TEST: CPT | Performed by: INTERNAL MEDICINE

## 2025-03-31 PROCEDURE — 99214 OFFICE O/P EST MOD 30 MIN: CPT

## 2025-03-31 NOTE — PROGRESS NOTES
.Weekly Wound Education Note    Teaching Provided To: Patient  Training Topics: Dressing, Discharge instructions, Cleasing and general instructions, Test/procedures, Off-loading  Training Method: Explain/Verbal, Written  Training Response: Reinforcement needed, Patient responds and understands            Patient states her TBI is scheduled for Thursday.  Alternate dressings honey gel at night and Gentamicin ointment in the morning daily.  Cover with dry dressings.  Offload as much as possible, wear Darco shoe with peg insert.

## 2025-03-31 NOTE — PROGRESS NOTES
[facility] PROGRESS NOTE  MINERVA MARTÍNEZ MD  3/31/2025    Chief Complaint:   Chief Complaint   Patient presents with    Wound Care     Follow-up for wound to toes to left foot. Arrives in a dacro shoe. States her TBI is scheduled within the next couple of weeks. Has been applying the gentamicin BID.       HPI:   Subjective   Becky Mabry is a 69 year old female coming in for a follow-up visit.    HPI    Wound improved minimally - no s/o active infection   Dimensions almost the same - less amount of slough  TBI scheduled for Thursday.     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 Pt says this is old.     BS ok today - received sensors finally  Last A1c value was 9.3% done 3/24/2025.        Review of Systems  Negative except HPI   Denies chest pain / SOB / palpitations  Denies fever.     Allergies  Allergies[1]    Current Meds:  Current Outpatient Medications   Medication Sig Dispense Refill    gentamicin 0.1 % External Ointment Apply 1 Application topically 3 (three) times daily. 30 g 3    Tirzepatide (MOUNJARO) 12.5 MG/0.5ML Subcutaneous Solution Auto-injector Inject 12.5 1e11 Vector Genomes/m2 into the skin once a week.      metOLazone 2.5 MG Oral Tab Take 1 tablet (2.5 mg total) by mouth daily.      apixaban 5 MG Oral Tab Take 2 tabs (10mg) by mouth twice daily for 7 days, then take 1 tab (5mg) by mouth twice daily thereafter. (Patient not taking: Reported on 3/3/2025) 74 tablet 0    tiZANidine 2 MG Oral Tab Take 1 tablet (2 mg total) by mouth every 6 (six) hours as needed.      FARXIGA 10 MG Oral Tab Take 1 tablet (10 mg total) by mouth daily.      TRULICITY 1.5 MG/0.5ML Subcutaneous Solution Pen-injector Inject into the skin once a week. (Patient not taking: Reported on 3/3/2025)      gabapentin 100 MG Oral Cap       levothyroxine 125  MCG Oral Tab Pt unsure of dose      Phentermine HCl 37.5 MG Oral Tab Take 1 tablet (37.5 mg total) by mouth daily as needed.      rOPINIRole 0.25 MG Oral Tab Take 4 tablets (1 mg total) by mouth at bedtime.      silver sulfADIAZINE 1 % External Cream       SUMAtriptan Succinate 100 MG Oral Tab TAKE 1 TABLET AS NEEDED ORALLY DAILY AS NEEDED 30      traZODone 50 MG Oral Tab 1 TABLET AT BEDTIME AS NEEDED ORALLY ONCE A DAY 90      allopurinol 300 MG Oral Tab Take 1 tablet (300 mg total) by mouth daily.      carvedilol 3.125 MG Oral Tab Pt unsure of dose      LANTUS SOLOSTAR 100 UNIT/ML Subcutaneous Solution Pen-injector       ergocalciferol 1.25 MG (68553 UT) Oral Cap Take 1 capsule (50,000 Units total) by mouth once a week. (Patient not taking: Reported on 3/3/2025)  1    Potassium Chloride ER 10 MEQ Oral Tab CR Take 1 tablet (10 mEq total) by mouth 2 (two) times daily.      torsemide 20 MG Oral Tab Take 1 tablet (20 mg total) by mouth daily. 30 tablet 0    insulin glargine 100 UNIT/ML Subcutaneous Solution Inject 50 Units into the skin 2 (two) times daily. 1 pen 3    buPROPion HCl ER, XL, 300 MG Oral Tablet 24 Hr Take 1 tablet (300 mg total) by mouth daily.      Pravastatin Sodium 80 MG Oral Tab Take 1 tablet (80 mg total) by mouth nightly.      spironolactone 25 MG Oral Tab Take 1 tablet (25 mg total) by mouth daily.      Insulin Lispro, Human, (HUMALOG) 100 UNIT/ML Subcutaneous Solution Inject 10-15 Units into the skin 3 (three) times daily before meals.      cetirizine (ZYRTEC) 10 MG Oral Tab Take 1 tablet (10 mg total) by mouth daily.           EXAM:   Objective   Objective    Physical Exam    Vital Signs  Vitals:    03/31/25 1100   BP: 152/75   Pulse: 73   Resp: 16   Temp: 98 °F (36.7 °C)       Wound Assessment  Wound 03/24/25 #2 Left 2nd Toe Toe (Comment which one) Left (Active)   Date First Assessed: 03/24/25    Wound Number (Wound Clinic Only): #2 Left 2nd Toe  Primary Wound Type: Diabetic Ulcer  Location: Toe  (Comment which one)  Wound Location Orientation: Left      Assessments 3/24/2025  9:46 AM 3/31/2025  1:12 PM   Wound Image       Drainage Amount Moderate Small   Drainage Description Serosanguineous Yellow;Serous   Wound Length (cm) 2.6 cm 0.9 cm   Wound Width (cm) 1.5 cm 1 cm   Wound Surface Area (cm^2) 3.9 cm^2 0.9 cm^2   Wound Depth (cm) 0.1 cm 0.1 cm   Wound Volume (cm^3) 0.39 cm^3 0.09 cm^3   Wound Healing % -- 77   Margins Well-defined edges Well-defined edges   Non-staged Wound Description Full thickness Full thickness   Jennifer-wound Assessment Moist;Maceration;Dry;Edema;Callous Callous   Wound Granulation Tissue Pink;Firm;Pale Grey Pale Grey;Pink;Spongy   Wound Bed Granulation (%) 50 % 100 %   Wound Bed Epithelium (%) 25 % --   Wound Bed Slough (%) 25 % --   Wound Odor None None   Shape bridged --   Tunneling? -- No   Undermining? -- No   Sinus Tracts? -- No   Guevara Scale Grade 2 Grade 2       No associated orders.       Wound 03/24/25 #3 Left Great Toe Left (Active)   Date First Assessed/Time First Assessed: 03/24/25 0942    Wound Number (Wound Clinic Only): #3 Left Great Toe  Primary Wound Type: Diabetic Ulcer  Wound Location Orientation: Left      Assessments 3/24/2025  9:48 AM 3/31/2025  1:13 PM   Wound Image         Drainage Amount Small Small   Drainage Description Serosanguineous Yellow;Serous   Wound Length (cm) 4.7 cm 4.6 cm   Wound Width (cm) 3.8 cm 2.5 cm   Wound Surface Area (cm^2) 17.86 cm^2 11.5 cm^2   Wound Depth (cm) 0.1 cm 0.1 cm   Wound Volume (cm^3) 1.786 cm^3 1.15 cm^3   Wound Healing % -- 36   Margins Well-defined edges Well-defined edges   Non-staged Wound Description Full thickness Full thickness   Jennifer-wound Assessment Moist;Maceration;Edema Edema;Moist   Wound Granulation Tissue Pink;Firm;Pale Grey Pink;Firm   Wound Bed Granulation (%) 20 % 20 %   Wound Bed Epithelium (%) 10 % --   Wound Bed Slough (%) 30 % 80 %   Wound Odor None None   Shape 40% Callous --   Tunneling? -- No    Undermining? -- No   Sinus Tracts? -- No   Guevara Scale Grade 2 Grade 2       No associated orders.                ASSESSMENT AND PLAN:     Assessment     Encounter Diagnosis  1. Ulcer of left foot, with fat layer exposed ([facility])    2. Diabetic ulcer of left midfoot associated with type 2 diabetes mellitus, with fat layer exposed ([facility])    3. Sloughing of wound    4. Delayed wound healing    5. Type 2 diabetes mellitus with hyperglycemia, with long-term current use of insulin ([facility])    6. Class 2 severe obesity due to excess calories with serious comorbidity and body mass index (BMI) of 36.0 to 36.9 in adult ([facility])    7. Stage 3 chronic kidney disease, unspecified whether stage 3a or 3b CKD ([facility])    8. Peripheral vascular disease, unspecified        PLAN OF CARE:    Start alternate gentamicin and honey gel   Get TBI done  Intense BS control  Keep well hydrated   Watch out for signs of early infection - counseled.   Plan of care discussed with patient in detail - All questions answered   Return in one week.       Patient Instructions     Return 1 week  Check TBI- scheduled for thursday    Wound Cleaning and Dressings:    Wash your hands with soap and water. Always wear gloves while changing dressings. Donot touch wound / lizbeth-wound skin with un-gloved hands. Remove old dressing, discard and place into trash.      DRESSINGS: gentamicin ointment alternate with honey gel / gauze  Change dressing twice daily.  Wear spandagrip    Off-Loading:  Get FOREFOOT OFFLOADING ORTHOSES BOOT   Darco shoe with PEg insert- for now.     Miscellaneous Instructions:  Supplement with a daily multivitamin   Low salt diet  Intense blood sugar control - Goal Blood sugar below 180 at all times recommended.  Increase protein intake / consider protein supplements - see below  Elevate extremities at all times when sitting / laying down.    DIETARY MODIFICATIONS TO HELP WITH WOUND HEALING:    Protein: Meats, beans, eggs, milk and yogurt particularly  Greek yogurt), tofu, soy nuts, soy protein products    Vitamin C: Citrus fruits and juices, strawberries, tomatoes, tomato juice, peppers, baked potatoes, spinach, broccoli, cauliflower, [place] sprouts, cabbage    Vitamin A: Dark green, leafy vegetables, orange or yellow vegetables, cantaloupe, fortified dairy products, liver, fortified cereals    Zinc: Fortified cereals, red meats, seafood    Consider Trent by [employer] (These are essential branch chain amino acids that help with tissue building and wound healing) and take 2 packets/day. you can order online at [employer] or [employer]    ADDITIONAL REMINDERS:    The treatment plan has been discussed at length with you and your provider. Follow all instructions carefully, it is very important. If you do not follow all instructions, you are at  risk of your wound not healing, infection, possible loss of limb and even end of life.  Please call the clinic during regular business hours ( 7:30 AM - 5:30 PM) if you notice increased bleeding, redness, warmth, pain or pus like drainage or start running a fever greater than 100.3.    For after hour emergencies, please call your primary physician or go to the nearest emergency room.      Patient/Caregiver Education: There are no barriers to learning. Medical education for above diagnosis given.   Answered all questions.    Outcome: Patient verbalizes understanding. Patient is notified to call with any questions, complications, allergies, or worsening or changing symptoms.  Patient is to call with any side effects or complications as a result of the treatments today.      DOCUMENTATION OF TIME SPENT: Code selection for this visit was based on time spent : 30 min on date of service in preparing to see the patient, obtaining and/or reviewing separately obtained history, performing a medically appropriate examination, counseling and educating the patient/family/caregiver, ordering medications or testing, referring and communicating  with other healthcare providers, documenting clinical information in the E HR, independently interpreting results and communicating results to the patient/family/caregiver and care coordination with the patient's other providers.    Followup: Return in about 1 week (around 4/7/2025) for Wound followup.      Note to Patient:  The 21st Century Cures Act makes medical notes like these available to patients in the interest of transparency. However, be advised this is a medical document and is intended as tyaw-pf-muht communication; it is written in medical language and may appear blunt, direct, or contain abbreviations or verbiage that are unfamiliar. Medical documents are intended to carry relevant information, facts as evident, and the clinical opinion of the practitioner.    Also, please note that this report has been produced using speech recognition software and may contain errors related to that system including, but not limited to, errors in grammar, punctuation, and spelling, as well as words and phrases that possibly may have been recognized inappropriately.  If there are any questions or concerns, contact the dictating provider for clarification.      Rojas Short MD  3/31/2025  1:32 PM                    [1] No Known Allergies

## 2025-03-31 NOTE — PATIENT INSTRUCTIONS
Return 1 week  Check TBI- scheduled for thursday    Wound Cleaning and Dressings:    Wash your hands with soap and water. Always wear gloves while changing dressings. Donot touch wound / lizbeth-wound skin with un-gloved hands. Remove old dressing, discard and place into trash.      DRESSINGS: gentamicin ointment alternate with honey gel / gauze  Change dressing twice daily.  Wear spandagrip    Off-Loading:  Get FOREFOOT OFFLOADING ORTHOSES BOOT   Darco shoe with PEg insert- for now.     Miscellaneous Instructions:  Supplement with a daily multivitamin   Low salt diet  Intense blood sugar control - Goal Blood sugar below 180 at all times recommended.  Increase protein intake / consider protein supplements - see below  Elevate extremities at all times when sitting / laying down.    DIETARY MODIFICATIONS TO HELP WITH WOUND HEALING:    Protein: Meats, beans, eggs, milk and yogurt particularly Greek yogurt), tofu, soy nuts, soy protein products    Vitamin C: Citrus fruits and juices, strawberries, tomatoes, tomato juice, peppers, baked potatoes, spinach, broccoli, cauliflower, [place] sprouts, cabbage    Vitamin A: Dark green, leafy vegetables, orange or yellow vegetables, cantaloupe, fortified dairy products, liver, fortified cereals    Zinc: Fortified cereals, red meats, seafood    Consider Trent by "[employer] (These are essential branch chain amino acids that help with tissue building and wound healing) and take 2 packets/day. you can order online at [employer] or [employer]    ADDITIONAL REMINDERS:    The treatment plan has been discussed at length with you and your provider. Follow all instructions carefully, it is very important. If you do not follow all instructions, you are at  risk of your wound not healing, infection, possible loss of limb and even end of life.  Please call the clinic during regular business hours ( 7:30 AM - 5:30 PM) if you notice increased bleeding, redness, warmth, pain or pus like drainage or  start running a fever greater than 100.3.    For after hour emergencies, please call your primary physician or go to the nearest emergency room.

## 2025-04-03 ENCOUNTER — HOSPITAL ENCOUNTER (OUTPATIENT)
Dept: ULTRASOUND IMAGING | Facility: HOSPITAL | Age: 70
Discharge: HOME | End: 2025-04-03
Attending: INTERNAL MEDICINE
Payer: MEDICARE

## 2025-04-03 DIAGNOSIS — E11.621 DIABETIC ULCER OF LEFT MIDFOOT ASSOCIATED WITH TYPE 2 DIABETES MELLITUS, WITH FAT LAYER EXPOSED (HCC): ICD-10-CM

## 2025-04-03 DIAGNOSIS — L97.422 DIABETIC ULCER OF LEFT MIDFOOT ASSOCIATED WITH TYPE 2 DIABETES MELLITUS, WITH FAT LAYER EXPOSED (HCC): ICD-10-CM

## 2025-04-03 DIAGNOSIS — I73.9 PERIPHERAL VASCULAR DISEASE, UNSPECIFIED: ICD-10-CM

## 2025-04-03 DIAGNOSIS — L97.522 ULCER OF LEFT FOOT, WITH FAT LAYER EXPOSED (HCC): ICD-10-CM

## 2025-04-03 PROCEDURE — 93922 UPR/L XTREMITY ART 2 LEVELS: CPT | Performed by: INTERNAL MEDICINE

## 2025-04-07 ENCOUNTER — TELEPHONE (OUTPATIENT)
Dept: WOUND CARE | Facility: HOSPITAL | Age: 70
End: 2025-04-07

## 2025-04-07 ENCOUNTER — APPOINTMENT (OUTPATIENT)
Dept: WOUND CARE | Facility: HOSPITAL | Age: 70
End: 2025-04-07
Attending: INTERNAL MEDICINE
Payer: MEDICARE

## 2025-04-07 NOTE — TELEPHONE ENCOUNTER
Writer called patient and instructed her to call a vascular surgeon regarding her TBI results.  Patient asked if we have anyone and writer provided Dr. Najjar information.  Patient stated she is really sick right now and will call later and will also call the wound clinic back and reschedule.

## 2025-04-07 NOTE — PROGRESS NOTES
LVM for patient to call clinic before 5:00 to discuss test results.  Not identifiable voice mail.
See TE in chart
General

## 2025-04-10 ENCOUNTER — HOSPITAL ENCOUNTER (OUTPATIENT)
Dept: ULTRASOUND IMAGING | Age: 70
Discharge: HOME | End: 2025-04-10
Attending: SPECIALIST
Payer: MEDICARE

## 2025-04-10 DIAGNOSIS — R60.0 LEG EDEMA, RIGHT: ICD-10-CM

## 2025-04-10 DIAGNOSIS — R60.0 EDEMA OF LEFT UPPER ARM: ICD-10-CM

## 2025-04-10 PROCEDURE — 93971 EXTREMITY STUDY: CPT | Performed by: SPECIALIST

## 2025-04-23 ENCOUNTER — OFFICE VISIT (OUTPATIENT)
Dept: WOUND CARE | Facility: HOSPITAL | Age: 70
End: 2025-04-23
Attending: INTERNAL MEDICINE
Payer: MEDICARE

## 2025-04-23 VITALS
HEART RATE: 68 BPM | TEMPERATURE: 97 F | SYSTOLIC BLOOD PRESSURE: 136 MMHG | DIASTOLIC BLOOD PRESSURE: 66 MMHG | RESPIRATION RATE: 16 BRPM

## 2025-04-23 DIAGNOSIS — E11.65 TYPE 2 DIABETES MELLITUS WITH HYPERGLYCEMIA, WITH LONG-TERM CURRENT USE OF INSULIN (HCC): ICD-10-CM

## 2025-04-23 DIAGNOSIS — N18.30 STAGE 3 CHRONIC KIDNEY DISEASE, UNSPECIFIED WHETHER STAGE 3A OR 3B CKD (HCC): ICD-10-CM

## 2025-04-23 DIAGNOSIS — L97.522 ULCER OF LEFT FOOT, WITH FAT LAYER EXPOSED (HCC): Primary | ICD-10-CM

## 2025-04-23 DIAGNOSIS — R23.8 SLOUGHING OF WOUND: ICD-10-CM

## 2025-04-23 DIAGNOSIS — I73.9 PERIPHERAL VASCULAR DISEASE, UNSPECIFIED: ICD-10-CM

## 2025-04-23 DIAGNOSIS — Z79.4 TYPE 2 DIABETES MELLITUS WITH HYPERGLYCEMIA, WITH LONG-TERM CURRENT USE OF INSULIN (HCC): ICD-10-CM

## 2025-04-23 DIAGNOSIS — E66.812 CLASS 2 SEVERE OBESITY DUE TO EXCESS CALORIES WITH SERIOUS COMORBIDITY AND BODY MASS INDEX (BMI) OF 36.0 TO 36.9 IN ADULT (HCC): ICD-10-CM

## 2025-04-23 DIAGNOSIS — T14.8XXD DELAYED WOUND HEALING: ICD-10-CM

## 2025-04-23 DIAGNOSIS — E11.621 DIABETIC ULCER OF LEFT MIDFOOT ASSOCIATED WITH TYPE 2 DIABETES MELLITUS, WITH FAT LAYER EXPOSED (HCC): ICD-10-CM

## 2025-04-23 DIAGNOSIS — E66.01 CLASS 2 SEVERE OBESITY DUE TO EXCESS CALORIES WITH SERIOUS COMORBIDITY AND BODY MASS INDEX (BMI) OF 36.0 TO 36.9 IN ADULT (HCC): ICD-10-CM

## 2025-04-23 DIAGNOSIS — L97.422 DIABETIC ULCER OF LEFT MIDFOOT ASSOCIATED WITH TYPE 2 DIABETES MELLITUS, WITH FAT LAYER EXPOSED (HCC): ICD-10-CM

## 2025-04-23 PROCEDURE — 97597 DBRDMT OPN WND 1ST 20 CM/<: CPT | Performed by: INTERNAL MEDICINE

## 2025-04-23 NOTE — PROGRESS NOTES
[facility] PROGRESS NOTE  MINERVA MARTÍNEZ MD  4/23/2025    Chief Complaint:   Chief Complaint   Patient presents with    Wound Care     Follow up for left toe wounds. No complaints at this time. Pt has been dressing with gentamicin and gauze with tape.       HPI:   Subjective   Becky Mabry is a 69 year old female coming in for a follow-up visit.    HPI    Wounds improved.   Plantar midfoot ulcer closed skin mature.   Second toe ulcer almost closed with some raw areas.   Completed gentamicin on wounds.   No s/o infection.   Tissue quality improved - more granulation   Dimensions improved.   Consult with dr. Najjar tomorrow for abnormal TBI    Review of Systems  Negative except HPI   Denies chest pain / SOB / palpitations  Denies fever.     Allergies  Allergies[1]    Current Meds:  Current Medications[2]      EXAM:   Objective   Objective    Physical Exam    Vital Signs  Vitals:    04/23/25 0700   BP: 136/66   Pulse: 68   Resp: 16   Temp: 97.4 °F (36.3 °C)       Wound Assessment  Wound 03/24/25 #2 Left 2nd Toe Toe (Comment which one) Left (Active)   Date First Assessed: 03/24/25    Wound Number (Wound Clinic Only): #2 Left 2nd Toe  Primary Wound Type: Diabetic Ulcer  Location: Toe (Comment which one)  Wound Location Orientation: Left      Assessments 3/24/2025  9:46 AM 4/23/2025  9:06 AM   Wound Image        Drainage Amount Moderate Small   Drainage Description Serosanguineous Yellow;Serous   Wound Length (cm) 2.6 cm 0.3 cm   Wound Width (cm) 1.5 cm 0.4 cm   Wound Surface Area (cm^2) 3.9 cm^2 0.09 cm^2   Wound Depth (cm) 0.1 cm 0.1 cm   Wound Volume (cm^3) 0.39 cm^3 0.006 cm^3   Wound Healing % -- 98   Margins Well-defined edges Undefined edges   Non-staged Wound Description Full thickness Full thickness   Jennifer-wound Assessment Moist;Maceration;Dry;Edema;Callous Callous;Maceration;Moist   Wound Granulation Tissue Pink;Firm;Pale Parson Pink;Spongy   Wound Bed Granulation (%) 50 % 100 %   Wound Bed Epithelium  (%) 25 % --   Wound Bed Slough (%) 25 % --   Wound Odor None None   Shape bridged --   Guevara Scale Grade 2 Grade 2       Active Orders   Date Order Priority Status Authorizing Provider   04/23/25 0935 Debridement Diabetic Ulcer Left Toe (Comment which one) Routine Active Rojas Robert MD       Wound 03/24/25 #3 Left Great Toe Left (Active)   Date First Assessed/Time First Assessed: 03/24/25 0942    Wound Number (Wound Clinic Only): #3 Left Great Toe  Primary Wound Type: Diabetic Ulcer  Wound Location Orientation: Left      Assessments 3/24/2025  9:48 AM 4/23/2025  9:08 AM   Wound Image         Drainage Amount Small Small   Drainage Description Serosanguineous Yellow;Serous   Wound Length (cm) 4.7 cm 4.8 cm   Wound Width (cm) 3.8 cm 1.5 cm   Wound Surface Area (cm^2) 17.86 cm^2 5.65 cm^2   Wound Depth (cm) 0.1 cm 0 cm   Wound Volume (cm^3) 1.786 cm^3 0 cm^3   Wound Healing % -- 100   Margins Well-defined edges Well-defined edges   Non-staged Wound Description Full thickness Full thickness   Jennifer-wound Assessment Moist;Maceration;Edema Edema;Moist;Maceration;Callous   Wound Granulation Tissue Pink;Firm;Pale Parson Pink;Spongy   Wound Bed Granulation (%) 20 % 30 %   Wound Bed Epithelium (%) 10 % 40 %   Wound Bed Slough (%) 30 % 30 %   Wound Odor None None   Shape 40% Callous Hypergranular, bridged, area of pressure noted to base of toe   Guevara Scale Grade 2 Grade 2       Active Orders   Date Order Priority Status Authorizing Provider   04/23/25 0936 Debridement Diabetic Ulcer Left Routine Active Rojas Robert MD                ASSESSMENT AND PLAN:     Assessment     Encounter Diagnosis  1. Ulcer of left foot, with fat layer exposed (HCC)    2. Diabetic ulcer of left midfoot associated with type 2 diabetes mellitus, with fat layer exposed ([facility])    3. Sloughing of wound    4. Delayed wound healing    5. Type 2 diabetes mellitus with hyperglycemia, with long-term current use of insulin ([facility])    6. Class 2  severe obesity due to excess calories with serious comorbidity and body mass index (BMI) of 36.0 to 36.9 in adult ([facility])    7. Stage 3 chronic kidney disease, unspecified whether stage 3a or 3b CKD ([facility])    8. Peripheral vascular disease, unspecified            PROCEDURES:    Debridement Diabetic Ulcer Left Toe (Comment which one)   Wound 03/24/25 #2 Left 2nd Toe Toe (Comment which one) Left     Performed by: Rojas Robert MD  Authorized by: Rojas Robert MD       Consent   Consent obtained? verbal  Consent given by: patient  Risks discussed? procedural risks discussed     Debridement Details  Performed by: physician  Debridement type: conservative sharp  Pain control: lidocaine 4%  Pain control administration type: topical     Pre-debridement measurements  Length (cm): 0.3  Width (cm): 0.4  Depth (cm): 0.1  Surface Area (cm^2): 0.09     Post-debridement measurements  Length (cm): 0.3  Width (cm): 0.4  Depth (cm): 0.1  Percent debrided: 100%  Surface Area (cm^2): 0.09  Area Debrided (cm^2): 0.09  Volume (cm^3): 0.01     Devitalized tissue debrided: biofilm and callus  Instrument(s) utilized: forceps  Bleeding: none  Hemostasis obtained with: not applicable  Procedural pain (0-10): 0  Post-procedural pain: 0   Response to treatment: procedure was tolerated well     Debridement Diabetic Ulcer Left   Wound 03/24/25 #3 Left Great Toe Left     Performed by: Rojas Robert MD  Authorized by: Rojas Robert MD       Consent   Consent obtained? verbal  Consent given by: patient  Risks discussed? procedural risks discussed     Debridement Details  Performed by: physician  Debridement type: conservative sharp  Pain control: lidocaine 4%  Pain control administration type: topical     Pre-debridement measurements  Length (cm): 4.8  Width (cm): 1.5  Depth (cm): 0  Surface Area (cm^2): 5.65     Post-debridement measurements  Length (cm): 4.8  Width (cm): 1.5  Depth (cm): 0.1  Percent debrided:  100%  Surface Area (cm^2): 5.65  Area Debrided (cm^2): 5.65  Volume (cm^3): 0.38     Devitalized tissue debrided: biofilm, callus and slough  Instrument(s) utilized: curette and forceps  Comment regarding bleeding: minimal  Hemostasis obtained with: pressure  Procedural pain (0-10): 0  Post-procedural pain: 0   Response to treatment: procedure was tolerated well     #2    #3              PLAN OF CARE:    Serial debridements to hasten healing.   Start honey gel on great toe ulcer   Barrier cream on second toe   Moisturize foot well  Vascular consult tomorrow  Run PA skin subs  Watch out for signs of early infection - counseled.   Plan of care discussed with patient in detail - All questions answered   Return in one week.     Orders  Orders Placed This Encounter   Procedures    Debridement Diabetic Ulcer Left Toe (Comment which one)    Debridement Diabetic Ulcer Left       Patient Instructions     Return 1 week  Vascular surgery consult tomorrow.     Wound Cleaning and Dressings:    Wash your hands with soap and water. Always wear gloves while changing dressings. Donot touch wound / lizbeth-wound skin with un-gloved hands. Remove old dressing, discard and place into trash.      DRESSINGS:  honey gel / gauze- big toe ulcer.   Barrier cream periwound.   Barrier cream only on second toe  No dressings on previously wounded area of plantar mid foot.   Change dressing daily.  Wear spandagrip    Off-Loading:  Get FOREFOOT OFFLOADING ORTHOSES BOOT   Darco shoe with PEg insert- for now.     Miscellaneous Instructions:  Supplement with a daily multivitamin   Low salt diet  Intense blood sugar control - Goal Blood sugar below 180 at all times recommended.  Increase protein intake / consider protein supplements - see below  Elevate extremities at all times when sitting / laying down.    DIETARY MODIFICATIONS TO HELP WITH WOUND HEALING:    Protein: Meats, beans, eggs, milk and yogurt particularly Greek yogurt), tofu, soy nuts, soy  protein products    Vitamin C: Citrus fruits and juices, strawberries, tomatoes, tomato juice, peppers, baked potatoes, spinach, broccoli, cauliflower, [place] sprouts, cabbage    Vitamin A: Dark green, leafy vegetables, orange or yellow vegetables, cantaloupe, fortified dairy products, liver, fortified cereals    Zinc: Fortified cereals, red meats, seafood    Consider Trent by [employer] (These are essential branch chain amino acids that help with tissue building and wound healing) and take 2 packets/day. you can order online at [employer] or [employer]    ADDITIONAL REMINDERS:    The treatment plan has been discussed at length with you and your provider. Follow all instructions carefully, it is very important. If you do not follow all instructions, you are at  risk of your wound not healing, infection, possible loss of limb and even end of life.  Please call the clinic during regular business hours ( 7:30 AM - 5:30 PM) if you notice increased bleeding, redness, warmth, pain or pus like drainage or start running a fever greater than 100.3.    For after hour emergencies, please call your primary physician or go to the nearest emergency room.      Patient/Caregiver Education: There are no barriers to learning. Medical education for above diagnosis given.   Answered all questions.    Outcome: Patient verbalizes understanding. Patient is notified to call with any questions, complications, allergies, or worsening or changing symptoms.  Patient is to call with any side effects or complications as a result of the treatments today.      DOCUMENTATION OF TIME SPENT: Code selection for this visit was based on time spent : 35 min on date of service in preparing to see the patient, obtaining and/or reviewing separately obtained history, performing a medically appropriate examination, counseling and educating the patient/family/caregiver, ordering medications or testing, referring and communicating with other healthcare providers,  documenting clinical information in the E HR, independently interpreting results and communicating results to the patient/family/caregiver and care coordination with the patient's other providers.    Followup: Return in 1 week (on 4/30/2025) for Wound followup.      Note to Patient:  The 21st Century Cures Act makes medical notes like these available to patients in the interest of transparency. However, be advised this is a medical document and is intended as [id number] communication; it is written in medical language and may appear blunt, direct, or contain abbreviations or verbiage that are unfamiliar. Medical documents are intended to carry relevant information, facts as evident, and the clinical opinion of the practitioner.    Also, please note that this report has been produced using speech recognition software and may contain errors related to that system including, but not limited to, errors in grammar, punctuation, and spelling, as well as words and phrases that possibly may have been recognized inappropriately.  If there are any questions or concerns, contact the dictating provider for clarification.      Rojas Short MD  4/23/2025  9:02 AM                      [1] No Known Allergies  [2]   Current Outpatient Medications   Medication Sig Dispense Refill    gentamicin 0.1 % External Ointment Apply 1 Application topically 3 (three) times daily. 30 g 3    Tirzepatide (MOUNJARO) 12.5 MG/0.5ML Subcutaneous Solution Auto-injector Inject 12.5 1e11 Vector Genomes/m2 into the skin once a week.      metOLazone 2.5 MG Oral Tab Take 1 tablet (2.5 mg total) by mouth daily.      apixaban 5 MG Oral Tab Take 2 tabs (10mg) by mouth twice daily for 7 days, then take 1 tab (5mg) by mouth twice daily thereafter. (Patient not taking: Reported on 3/3/2025) 74 tablet 0    tiZANidine 2 MG Oral Tab Take 1 tablet (2 mg total) by mouth every 6 (six) hours as needed.      FARXIGA 10 MG Oral Tab Take 1 tablet (10 mg total) by  mouth daily.      TRULICITY 1.5 MG/0.5ML Subcutaneous Solution Pen-injector Inject into the skin once a week. (Patient not taking: Reported on 3/3/2025)      gabapentin 100 MG Oral Cap       levothyroxine 125 MCG Oral Tab Pt unsure of dose      Phentermine HCl 37.5 MG Oral Tab Take 1 tablet (37.5 mg total) by mouth daily as needed.      rOPINIRole 0.25 MG Oral Tab Take 4 tablets (1 mg total) by mouth at bedtime.      silver sulfADIAZINE 1 % External Cream       SUMAtriptan Succinate 100 MG Oral Tab TAKE 1 TABLET AS NEEDED ORALLY DAILY AS NEEDED 30      traZODone 50 MG Oral Tab 1 TABLET AT BEDTIME AS NEEDED ORALLY ONCE A DAY 90      allopurinol 300 MG Oral Tab Take 1 tablet (300 mg total) by mouth daily.      carvedilol 3.125 MG Oral Tab Pt unsure of dose      LANTUS SOLOSTAR 100 UNIT/ML Subcutaneous Solution Pen-injector       ergocalciferol 1.25 MG (64164 UT) Oral Cap Take 1 capsule (50,000 Units total) by mouth once a week. (Patient not taking: Reported on 3/3/2025)  1    Potassium Chloride ER 10 MEQ Oral Tab CR Take 1 tablet (10 mEq total) by mouth 2 (two) times daily.      torsemide 20 MG Oral Tab Take 1 tablet (20 mg total) by mouth daily. 30 tablet 0    insulin glargine 100 UNIT/ML Subcutaneous Solution Inject 50 Units into the skin 2 (two) times daily. 1 pen 3    buPROPion HCl ER, XL, 300 MG Oral Tablet 24 Hr Take 1 tablet (300 mg total) by mouth daily.      Pravastatin Sodium 80 MG Oral Tab Take 1 tablet (80 mg total) by mouth nightly.      spironolactone 25 MG Oral Tab Take 1 tablet (25 mg total) by mouth daily.      Insulin Lispro, Human, (HUMALOG) 100 UNIT/ML Subcutaneous Solution Inject 10-15 Units into the skin 3 (three) times daily before meals.      cetirizine (ZYRTEC) 10 MG Oral Tab Take 1 tablet (10 mg total) by mouth daily.

## 2025-04-23 NOTE — PROGRESS NOTES
Patient ID: Becky Mabry is a 69 year old female.    Debridement Diabetic Ulcer Left Toe (Comment which one)   Wound 03/24/25 #2 Left 2nd Toe Toe (Comment which one) Left    Performed by: Rojas Robert MD  Authorized by: Rojas Robert MD      Consent   Consent obtained? verbal  Consent given by: patient  Risks discussed? procedural risks discussed    Debridement Details  Performed by: physician  Debridement type: conservative sharp  Pain control: lidocaine 4%  Pain control administration type: topical    Pre-debridement measurements  Length (cm): 0.3  Width (cm): 0.4  Depth (cm): 0.1  Surface Area (cm^2): 0.09    Post-debridement measurements  Length (cm): 0.3  Width (cm): 0.4  Depth (cm): 0.1  Percent debrided: 100%  Surface Area (cm^2): 0.09  Area Debrided (cm^2): 0.09  Volume (cm^3): 0.01    Devitalized tissue debrided: biofilm and callus  Instrument(s) utilized: forceps  Bleeding: none  Hemostasis obtained with: not applicable  Procedural pain (0-10): 0  Post-procedural pain: 0   Response to treatment: procedure was tolerated well    Debridement Diabetic Ulcer Left   Wound 03/24/25 #3 Left Great Toe Left    Performed by: Rojas Robert MD  Authorized by: Rojas Rboert MD      Consent   Consent obtained? verbal  Consent given by: patient  Risks discussed? procedural risks discussed    Debridement Details  Performed by: physician  Debridement type: conservative sharp  Pain control: lidocaine 4%  Pain control administration type: topical    Pre-debridement measurements  Length (cm): 4.8  Width (cm): 1.5  Depth (cm): 0  Surface Area (cm^2): 5.65    Post-debridement measurements  Length (cm): 4.8  Width (cm): 1.5  Depth (cm): 0.1  Percent debrided: 100%  Surface Area (cm^2): 5.65  Area Debrided (cm^2): 5.65  Volume (cm^3): 0.38    Devitalized tissue debrided: biofilm, callus and slough  Instrument(s) utilized: curette and forceps  Comment regarding bleeding: minimal  Hemostasis obtained  with: pressure  Procedural pain (0-10): 0  Post-procedural pain: 0   Response to treatment: procedure was tolerated well    #2    #3

## 2025-04-23 NOTE — PROGRESS NOTES
Weekly Wound Education Note    Teaching Provided To: Patient  Training Topics: Cleasing and general instructions, Discharge instructions, Dressing  Training Method: Explain/Verbal  Training Response: Patient responds and understands        Notes: Improving. Vashe soak prior to dressing application. Left great toe: triad paste to periwound, honey gel, gauze, and tape. Left 2nd toe: triad paste, gauze, and tape. information provided on vashe solution and triad paste for patient to purchase. Pt states she is scheduled to see vascular surgeon tomorrow.

## 2025-04-23 NOTE — PATIENT INSTRUCTIONS
Return 1 week  Vascular surgery consult tomorrow.     Wound Cleaning and Dressings:    Wash your hands with soap and water. Always wear gloves while changing dressings. Donot touch wound / lizbeth-wound skin with un-gloved hands. Remove old dressing, discard and place into trash.      DRESSINGS:  honey gel / gauze- big toe ulcer.   Barrier cream periwound.   Barrier cream only on second toe  No dressings on previously wounded area of plantar mid foot.   Change dressing daily.  Wear spandagrip    Off-Loading:  Get FOREFOOT OFFLOADING ORTHOSES BOOT   Darco shoe with PEg insert- for now.     Miscellaneous Instructions:  Supplement with a daily multivitamin   Low salt diet  Intense blood sugar control - Goal Blood sugar below 180 at all times recommended.  Increase protein intake / consider protein supplements - see below  Elevate extremities at all times when sitting / laying down.    DIETARY MODIFICATIONS TO HELP WITH WOUND HEALING:    Protein: Meats, beans, eggs, milk and yogurt particularly Greek yogurt), tofu, soy nuts, soy protein products    Vitamin C: Citrus fruits and juices, strawberries, tomatoes, tomato juice, peppers, baked potatoes, spinach, broccoli, cauliflower, [place] sprouts, cabbage    Vitamin A: Dark green, leafy vegetables, orange or yellow vegetables, cantaloupe, fortified dairy products, liver, fortified cereals    Zinc: Fortified cereals, red meats, seafood    Consider Trent by [employer] (These are essential branch chain amino acids that help with tissue building and wound healing) and take 2 packets/day. you can order online at [employer] or [employer]    ADDITIONAL REMINDERS:    The treatment plan has been discussed at length with you and your provider. Follow all instructions carefully, it is very important. If you do not follow all instructions, you are at  risk of your wound not healing, infection, possible loss of limb and even end of life.  Please call the clinic during regular business hours (  7:30 AM - 5:30 PM) if you notice increased bleeding, redness, warmth, pain or pus like drainage or start running a fever greater than 100.3.    For after hour emergencies, please call your primary physician or go to the nearest emergency room.

## 2025-04-28 ENCOUNTER — TELEPHONE (OUTPATIENT)
Age: 70
End: 2025-04-28

## 2025-04-30 ENCOUNTER — OFFICE VISIT (OUTPATIENT)
Dept: WOUND CARE | Facility: HOSPITAL | Age: 70
End: 2025-04-30
Attending: INTERNAL MEDICINE
Payer: MEDICARE

## 2025-04-30 VITALS
SYSTOLIC BLOOD PRESSURE: 127 MMHG | DIASTOLIC BLOOD PRESSURE: 63 MMHG | TEMPERATURE: 98 F | RESPIRATION RATE: 16 BRPM | HEART RATE: 71 BPM

## 2025-04-30 DIAGNOSIS — I73.9 PERIPHERAL VASCULAR DISEASE, UNSPECIFIED: ICD-10-CM

## 2025-04-30 DIAGNOSIS — R23.8 SLOUGHING OF WOUND: ICD-10-CM

## 2025-04-30 DIAGNOSIS — E11.65 TYPE 2 DIABETES MELLITUS WITH HYPERGLYCEMIA, WITH LONG-TERM CURRENT USE OF INSULIN (HCC): ICD-10-CM

## 2025-04-30 DIAGNOSIS — L97.522 ULCER OF LEFT FOOT, WITH FAT LAYER EXPOSED (HCC): Primary | ICD-10-CM

## 2025-04-30 DIAGNOSIS — E11.621 DIABETIC ULCER OF LEFT MIDFOOT ASSOCIATED WITH TYPE 2 DIABETES MELLITUS, WITH FAT LAYER EXPOSED (HCC): ICD-10-CM

## 2025-04-30 DIAGNOSIS — T14.8XXD DELAYED WOUND HEALING: ICD-10-CM

## 2025-04-30 DIAGNOSIS — L97.422 DIABETIC ULCER OF LEFT MIDFOOT ASSOCIATED WITH TYPE 2 DIABETES MELLITUS, WITH FAT LAYER EXPOSED (HCC): ICD-10-CM

## 2025-04-30 DIAGNOSIS — Z79.4 TYPE 2 DIABETES MELLITUS WITH HYPERGLYCEMIA, WITH LONG-TERM CURRENT USE OF INSULIN (HCC): ICD-10-CM

## 2025-04-30 LAB — GLUCOSE BLD-MCNC: 124 MG/DL (ref 70–99)

## 2025-04-30 PROCEDURE — 99214 OFFICE O/P EST MOD 30 MIN: CPT

## 2025-04-30 PROCEDURE — 82962 GLUCOSE BLOOD TEST: CPT | Performed by: INTERNAL MEDICINE

## 2025-04-30 NOTE — PROGRESS NOTES
Weekly Wound Education Note    Teaching Provided To: Patient  Training Topics: Cleasing and general instructions, Discharge instructions, Dressing, Off-loading  Training Method: Explain/Verbal  Training Response: Patient responds and understands        Notes: Improving. Left great toe: honey gel and bordered gauze. Left 2nd toe: triad paste and bandaid. Continue using dacro shoe to offload. Ordering supplies from [employer]. Pt concerned with plantar foot - no issues noted. Advised to make appointment with podiatrist for callous pairing and routine maintenance as she is diabetic.

## 2025-04-30 NOTE — PATIENT INSTRUCTIONS
Return 2 week    Wound Cleaning and Dressings:    Wash your hands with soap and water. Always wear gloves while changing dressings. Donot touch wound / lizbeth-wound skin with un-gloved hands. Remove old dressing, discard and place into trash.      DRESSINGS:  honey gel / gauze- big toe ulcer.   Barrier cream periwound.   Barrier cream only on second toe  No dressings on previously wounded area of plantar mid foot.   Change dressing daily.  Wear spandagrip    Off-Loading:  Get FOREFOOT OFFLOADING ORTHOSES BOOT   Darco shoe with PEg insert- for now.     Miscellaneous Instructions:  Supplement with a daily multivitamin   Low salt diet  Intense blood sugar control - Goal Blood sugar below 180 at all times recommended.  Increase protein intake / consider protein supplements - see below  Elevate extremities at all times when sitting / laying down.    DIETARY MODIFICATIONS TO HELP WITH WOUND HEALING:    Protein: Meats, beans, eggs, milk and yogurt particularly Greek yogurt), tofu, soy nuts, soy protein products    Vitamin C: Citrus fruits and juices, strawberries, tomatoes, tomato juice, peppers, baked potatoes, spinach, broccoli, cauliflower, [place] sprouts, cabbage    Vitamin A: Dark green, leafy vegetables, orange or yellow vegetables, cantaloupe, fortified dairy products, liver, fortified cereals    Zinc: Fortified cereals, red meats, seafood    Consider Trent by [employer] (These are essential branch chain amino acids that help with tissue building and wound healing) and take 2 packets/day. you can order online at [employer] or [employer]    ADDITIONAL REMINDERS:    The treatment plan has been discussed at length with you and your provider. Follow all instructions carefully, it is very important. If you do not follow all instructions, you are at  risk of your wound not healing, infection, possible loss of limb and even end of life.  Please call the clinic during regular business hours ( 7:30 AM - 5:30 PM) if you notice  increased bleeding, redness, warmth, pain or pus like drainage or start running a fever greater than 100.3.    For after hour emergencies, please call your primary physician or go to the nearest emergency room.

## 2025-04-30 NOTE — PROGRESS NOTES
[facility] PROGRESS NOTE  MINERVA MARTÍNEZ MD  4/30/2025    Chief Complaint:   Chief Complaint   Patient presents with    Wound Care     Follow up for left toe wounds. No complaints at this time.        HPI:   Subjective   Becky Mabry is a 69 year old female coming in for a follow-up visit.    HPI    Wound improved.   Dimensions and tissue quality better  Responding well to honey gel.   No s/o infection.     Has some callus on plantar foot she requested to be pared.   But  I think it is too thin to be pared.   Need to see podiatry regularly.     Review of Systems  Negative except HPI   Denies chest pain / SOB / palpitations  Denies fever.     Allergies  Allergies[1]    Current Meds:  Current Medications[2]      EXAM:   Objective   Objective    Physical Exam    Vital Signs  Vitals:    04/30/25 1100   BP: 127/63   Pulse: 71   Resp: 16   Temp: 98 °F (36.7 °C)       Wound Assessment  Wound 03/24/25 #2 Left 2nd Toe Toe (Comment which one) Left (Active)   Date First Assessed: 03/24/25    Wound Number (Wound Clinic Only): #2 Left 2nd Toe  Primary Wound Type: Diabetic Ulcer  Location: Toe (Comment which one)  Wound Location Orientation: Left      Assessments 3/24/2025  9:46 AM 4/30/2025 11:18 AM   Wound Image       Drainage Amount Moderate Small   Drainage Description Serosanguineous Serosanguineous   Wound Length (cm) 2.6 cm 0.1 cm   Wound Width (cm) 1.5 cm 0.1 cm   Wound Surface Area (cm^2) 3.9 cm^2 0.01 cm^2   Wound Depth (cm) 0.1 cm 0.1 cm   Wound Volume (cm^3) 0.39 cm^3 0.001 cm^3   Wound Healing % -- 100   Margins Well-defined edges Well-defined edges   Non-staged Wound Description Full thickness Full thickness   Jennifer-wound Assessment Moist;Maceration;Dry;Edema;Callous Dry   Wound Granulation Tissue Pink;Firm;Pale Grey Pink;Firm   Wound Bed Granulation (%) 50 % 100 %   Wound Bed Epithelium (%) 25 % --   Wound Bed Slough (%) 25 % --   Wound Odor None None   Shape bridged --   Tunneling? -- No   Undermining? --  No   Sinus Tracts? -- No   Guevara Scale Grade 2 Grade 2       Inactive Orders   Date Order Priority Status Authorizing Provider   04/23/25 0935 Debridement Diabetic Ulcer Left Toe (Comment which one) Routine Completed Rojas Robert MD       Wound 03/24/25 #3 Left Great Toe Left (Active)   Date First Assessed/Time First Assessed: 03/24/25 0942    Wound Number (Wound Clinic Only): #3 Left Great Toe  Primary Wound Type: Diabetic Ulcer  Wound Location Orientation: Left      Assessments 3/24/2025  9:48 AM 4/30/2025 11:18 AM   Wound Image        Drainage Amount Small Large   Drainage Description Serosanguineous Serosanguineous   Wound Length (cm) 4.7 cm 4 cm   Wound Width (cm) 3.8 cm 1 cm   Wound Surface Area (cm^2) 17.86 cm^2 3.14 cm^2   Wound Depth (cm) 0.1 cm 0.1 cm   Wound Volume (cm^3) 1.786 cm^3 0.209 cm^3   Wound Healing % -- 88   Margins Well-defined edges Well-defined edges   Non-staged Wound Description Full thickness Full thickness   Jennifer-wound Assessment Moist;Maceration;Edema Edema;Moist;Maceration   Wound Granulation Tissue Pink;Firm;Pale Parson Pink;Spongy   Wound Bed Granulation (%) 20 % 50 %   Wound Bed Epithelium (%) 10 % 20 %   Wound Bed Slough (%) 30 % 30 %   Wound Odor None None   Shape 40% Callous Hypergranular, bridged   Tunneling? -- No   Undermining? -- No   Sinus Tracts? -- No   Guevara Scale Grade 2 Grade 2       Inactive Orders   Date Order Priority Status Authorizing Provider   04/23/25 0936 Debridement Diabetic Ulcer Left Routine Completed Rojas Robert MD                ASSESSMENT AND PLAN:     Assessment     Encounter Diagnosis  1. Ulcer of left foot, with fat layer exposed (HCC)    2. Diabetic ulcer of left midfoot associated with type 2 diabetes mellitus, with fat layer exposed (HCC)    3. Sloughing of wound    4. Delayed wound healing    5. Type 2 diabetes mellitus with hyperglycemia, with long-term current use of insulin (HCC)    6. Peripheral vascular disease, unspecified         PLAN OF CARE:    Left great toe: honey gel and bordered gauze.   Left 2nd toe: triad paste and bandaid.   Continue using dacro shoe to offload.   Ordering supplies from [employer].   Pt concerned with plantar foot - no issues noted. Advised to make appointment with podiatrist for callous pairing and routine maintenance as she is diabetic.   Watch out for signs of early infection - counseled.   Plan of care discussed with patient in detail - All questions answered   Return in 2 week.     Patient Instructions     Return 2 week    Wound Cleaning and Dressings:    Wash your hands with soap and water. Always wear gloves while changing dressings. Donot touch wound / lizbeth-wound skin with un-gloved hands. Remove old dressing, discard and place into trash.      DRESSINGS:  honey gel / gauze- big toe ulcer.   Barrier cream periwound.   Barrier cream only on second toe  No dressings on previously wounded area of plantar mid foot.   Change dressing daily.  Wear spandagrip    Off-Loading:  Get FOREFOOT OFFLOADING ORTHOSES BOOT   Darco shoe with PEg insert- for now.     Miscellaneous Instructions:  Supplement with a daily multivitamin   Low salt diet  Intense blood sugar control - Goal Blood sugar below 180 at all times recommended.  Increase protein intake / consider protein supplements - see below  Elevate extremities at all times when sitting / laying down.    DIETARY MODIFICATIONS TO HELP WITH WOUND HEALING:    Protein: Meats, beans, eggs, milk and yogurt particularly Greek yogurt), tofu, soy nuts, soy protein products    Vitamin C: Citrus fruits and juices, strawberries, tomatoes, tomato juice, peppers, baked potatoes, spinach, broccoli, cauliflower, [place] sprouts, cabbage    Vitamin A: Dark green, leafy vegetables, orange or yellow vegetables, cantaloupe, fortified dairy products, liver, fortified cereals    Zinc: Fortified cereals, red meats, seafood    Consider Trent by [employer] (These are essential branch chain  amino acids that help with tissue building and wound healing) and take 2 packets/day. you can order online at [employer] or [employer]    ADDITIONAL REMINDERS:    The treatment plan has been discussed at length with you and your provider. Follow all instructions carefully, it is very important. If you do not follow all instructions, you are at  risk of your wound not healing, infection, possible loss of limb and even end of life.  Please call the clinic during regular business hours ( 7:30 AM - 5:30 PM) if you notice increased bleeding, redness, warmth, pain or pus like drainage or start running a fever greater than 100.3.    For after hour emergencies, please call your primary physician or go to the nearest emergency room.      Patient/Caregiver Education: There are no barriers to learning. Medical education for above diagnosis given.   Answered all questions.    Outcome: Patient verbalizes understanding. Patient is notified to call with any questions, complications, allergies, or worsening or changing symptoms.  Patient is to call with any side effects or complications as a result of the treatments today.      DOCUMENTATION OF TIME SPENT: Code selection for this visit was based on time spent : 25 min on date of service in preparing to see the patient, obtaining and/or reviewing separately obtained history, performing a medically appropriate examination, counseling and educating the patient/family/caregiver, ordering medications or testing, referring and communicating with other healthcare providers, documenting clinical information in the E HR, independently interpreting results and communicating results to the patient/family/caregiver and care coordination with the patient's other providers.    Followup: Return in about 2 weeks (around 5/14/2025) for Wound followup.      Note to Patient:  The 21st Century Cures Act makes medical notes like these available to patients in the interest of transparency. However, be advised  this is a medical document and is intended as [id number] communication; it is written in medical language and may appear blunt, direct, or contain abbreviations or verbiage that are unfamiliar. Medical documents are intended to carry relevant information, facts as evident, and the clinical opinion of the practitioner.    Also, please note that this report has been produced using speech recognition software and may contain errors related to that system including, but not limited to, errors in grammar, punctuation, and spelling, as well as words and phrases that possibly may have been recognized inappropriately.  If there are any questions or concerns, contact the dictating provider for clarification.      Rojas Short MD  4/30/2025  1:58 PM                      [1] No Known Allergies  [2]   Current Outpatient Medications   Medication Sig Dispense Refill    gentamicin 0.1 % External Ointment Apply 1 Application topically 3 (three) times daily. 30 g 3    Tirzepatide (MOUNJARO) 12.5 MG/0.5ML Subcutaneous Solution Auto-injector Inject 15 1e11 Vector Genomes/m2 into the skin once a week.      metOLazone 2.5 MG Oral Tab Take 1 tablet (2.5 mg total) by mouth daily.      apixaban 5 MG Oral Tab Take 2 tabs (10mg) by mouth twice daily for 7 days, then take 1 tab (5mg) by mouth twice daily thereafter. (Patient not taking: Reported on 3/3/2025) 74 tablet 0    tiZANidine 2 MG Oral Tab Take 1 tablet (2 mg total) by mouth every 6 (six) hours as needed.      FARXIGA 10 MG Oral Tab Take 1 tablet (10 mg total) by mouth daily.      TRULICITY 1.5 MG/0.5ML Subcutaneous Solution Pen-injector Inject into the skin once a week. (Patient not taking: Reported on 3/3/2025)      gabapentin 100 MG Oral Cap       levothyroxine 125 MCG Oral Tab Pt unsure of dose      Phentermine HCl 37.5 MG Oral Tab Take 1 tablet (37.5 mg total) by mouth daily as needed.      rOPINIRole 0.25 MG Oral Tab Take 4 tablets (1 mg total) by mouth at bedtime.       silver sulfADIAZINE 1 % External Cream       SUMAtriptan Succinate 100 MG Oral Tab TAKE 1 TABLET AS NEEDED ORALLY DAILY AS NEEDED 30      traZODone 50 MG Oral Tab 1 TABLET AT BEDTIME AS NEEDED ORALLY ONCE A DAY 90      allopurinol 300 MG Oral Tab Take 1 tablet (300 mg total) by mouth daily.      carvedilol 3.125 MG Oral Tab Pt unsure of dose      LANTUS SOLOSTAR 100 UNIT/ML Subcutaneous Solution Pen-injector       ergocalciferol 1.25 MG (21052 UT) Oral Cap Take 1 capsule (50,000 Units total) by mouth once a week. (Patient not taking: Reported on 3/3/2025)  1    Potassium Chloride ER 10 MEQ Oral Tab CR Take 1 tablet (10 mEq total) by mouth 2 (two) times daily.      torsemide 20 MG Oral Tab Take 1 tablet (20 mg total) by mouth daily. 30 tablet 0    insulin glargine 100 UNIT/ML Subcutaneous Solution Inject 50 Units into the skin 2 (two) times daily. 1 pen 3    buPROPion HCl ER, XL, 300 MG Oral Tablet 24 Hr Take 1 tablet (300 mg total) by mouth daily.      Pravastatin Sodium 80 MG Oral Tab Take 1 tablet (80 mg total) by mouth nightly.      spironolactone 25 MG Oral Tab Take 1 tablet (25 mg total) by mouth daily.      Insulin Lispro, Human, (HUMALOG) 100 UNIT/ML Subcutaneous Solution Inject 10-15 Units into the skin 3 (three) times daily before meals.      cetirizine (ZYRTEC) 10 MG Oral Tab Take 1 tablet (10 mg total) by mouth daily.

## 2025-05-14 ENCOUNTER — OFFICE VISIT (OUTPATIENT)
Dept: WOUND CARE | Facility: HOSPITAL | Age: 70
End: 2025-05-14
Attending: INTERNAL MEDICINE
Payer: MEDICARE

## 2025-05-14 VITALS
HEART RATE: 82 BPM | DIASTOLIC BLOOD PRESSURE: 74 MMHG | SYSTOLIC BLOOD PRESSURE: 136 MMHG | TEMPERATURE: 98 F | RESPIRATION RATE: 14 BRPM

## 2025-05-14 DIAGNOSIS — E66.01 CLASS 2 SEVERE OBESITY DUE TO EXCESS CALORIES WITH SERIOUS COMORBIDITY AND BODY MASS INDEX (BMI) OF 36.0 TO 36.9 IN ADULT (HCC): ICD-10-CM

## 2025-05-14 DIAGNOSIS — E11.621 DIABETIC ULCER OF LEFT MIDFOOT ASSOCIATED WITH TYPE 2 DIABETES MELLITUS, WITH FAT LAYER EXPOSED (HCC): ICD-10-CM

## 2025-05-14 DIAGNOSIS — Z79.4 TYPE 2 DIABETES MELLITUS WITH HYPERGLYCEMIA, WITH LONG-TERM CURRENT USE OF INSULIN (HCC): ICD-10-CM

## 2025-05-14 DIAGNOSIS — T14.8XXD DELAYED WOUND HEALING: ICD-10-CM

## 2025-05-14 DIAGNOSIS — L97.422 DIABETIC ULCER OF LEFT MIDFOOT ASSOCIATED WITH TYPE 2 DIABETES MELLITUS, WITH FAT LAYER EXPOSED (HCC): ICD-10-CM

## 2025-05-14 DIAGNOSIS — I73.9 PERIPHERAL VASCULAR DISEASE, UNSPECIFIED: ICD-10-CM

## 2025-05-14 DIAGNOSIS — E11.65 TYPE 2 DIABETES MELLITUS WITH HYPERGLYCEMIA, WITH LONG-TERM CURRENT USE OF INSULIN (HCC): ICD-10-CM

## 2025-05-14 DIAGNOSIS — R23.8 SLOUGHING OF WOUND: ICD-10-CM

## 2025-05-14 DIAGNOSIS — E66.812 CLASS 2 SEVERE OBESITY DUE TO EXCESS CALORIES WITH SERIOUS COMORBIDITY AND BODY MASS INDEX (BMI) OF 36.0 TO 36.9 IN ADULT (HCC): ICD-10-CM

## 2025-05-14 DIAGNOSIS — L97.522 ULCER OF LEFT FOOT, WITH FAT LAYER EXPOSED (HCC): Primary | ICD-10-CM

## 2025-05-14 DIAGNOSIS — N18.30 STAGE 3 CHRONIC KIDNEY DISEASE, UNSPECIFIED WHETHER STAGE 3A OR 3B CKD (HCC): ICD-10-CM

## 2025-05-14 PROCEDURE — 97597 DBRDMT OPN WND 1ST 20 CM/<: CPT | Performed by: INTERNAL MEDICINE

## 2025-05-14 NOTE — PROGRESS NOTES
Weekly Wound Education Note    Teaching Provided To: Patient  Training Topics: Cleasing and general instructions, Discharge instructions, Dressing, Off-loading, Foot care  Training Method: Explain/Verbal  Training Response: Patient responds and understands        Notes: Stable.Left 2nd toe: bandaid - change daily, monitor. Left great toe: vashe soak prior to dressing application. Honey gel and bordered gauze. Pt to continue wearing dacro shoe with peg liner. She states he has been on her feet a lot more due to packing as she is moving July. She is reminded that she should not be on her feet.

## 2025-05-14 NOTE — PROGRESS NOTES
[facility] PROGRESS NOTE  MINERVA MARTÍNEZ MD  5/14/2025    Chief Complaint:   Chief Complaint   Patient presents with    Wound Care     Follow up for left toe wounds. No complaints at this time.        HPI:   Subjective   Becky Mabry is a 69 year old female coming in for a follow-up visit.    HPI    Wounds stable.   Havent improved because she continues to walk on them.  She is noncompliant with recommendation to offload the wounded areas.  She is continuing to work on them because she has been packing for her upcoming move 6 weeks later.    The wound base has a slough on it and also covered with significant amount of nonviable tissue in the periwound area and edges.  There is also dried up running around the wound.  The second toe ulcer almost seems to be closed however a small part of it is still open and there is significant nonviable tissue caked up around it as well.    All of these were debrided today during the visit.    Review of Systems  Negative except HPI   Denies chest pain / SOB / palpitations  Denies fever.     Allergies  Allergies[1]    Current Meds:  Current Medications[2]      EXAM:     Objective   Objective    Physical Exam    Vital Signs  Vitals:    05/14/25 0700   BP: 136/74   Pulse: 82   Resp: 14   Temp: 97.6 °F (36.4 °C)       Wound Assessment  Wound 03/24/25 #2 Left 2nd Toe Toe (Comment which one) Left (Active)   Date First Assessed: 03/24/25    Wound Number (Wound Clinic Only): #2 Left 2nd Toe  Primary Wound Type: Diabetic Ulcer  Location: Toe (Comment which one)  Wound Location Orientation: Left      Assessments 3/24/2025  9:46 AM 5/14/2025 10:32 AM   Wound Image        Drainage Amount Moderate Small   Drainage Description Serosanguineous Yellow;Serous   Wound Length (cm) 2.6 cm 0.1 cm   Wound Width (cm) 1.5 cm 0.1 cm   Wound Surface Area (cm^2) 3.9 cm^2 0.01 cm^2   Wound Depth (cm) 0.1 cm 0.1 cm   Wound Volume (cm^3) 0.39 cm^3 0.001 cm^3   Wound Healing % -- 100   Margins  Well-defined edges Well-defined edges   Non-staged Wound Description Full thickness Full thickness   Jennifer-wound Assessment Moist;Maceration;Dry;Edema;Callous Dry;Callous   Wound Granulation Tissue Pink;Firm;Pale Grey Pink;Firm   Wound Bed Granulation (%) 50 % 100 %   Wound Bed Epithelium (%) 25 % --   Wound Bed Slough (%) 25 % --   Wound Odor None None   Shape bridged --   Tunneling? -- No   Undermining? -- No   Sinus Tracts? -- No   Guevara Scale Grade 2 Grade 2       Inactive Orders   Date Order Priority Status Authorizing Provider   04/23/25 0935 Debridement Diabetic Ulcer Left Toe (Comment which one) Routine Completed Rojas Robert MD       Wound 03/24/25 #3 Left Great Toe Left (Active)   Date First Assessed/Time First Assessed: 03/24/25 0942    Wound Number (Wound Clinic Only): #3 Left Great Toe  Primary Wound Type: Diabetic Ulcer  Wound Location Orientation: Left      Assessments 3/24/2025  9:48 AM 5/14/2025 10:31 AM   Wound Image         Drainage Amount Small Moderate   Drainage Description Serosanguineous Sanguineous   Wound Length (cm) 4.7 cm 1.8 cm   Wound Width (cm) 3.8 cm 1 cm   Wound Surface Area (cm^2) 17.86 cm^2 1.41 cm^2   Wound Depth (cm) 0.1 cm 0.1 cm   Wound Volume (cm^3) 1.786 cm^3 0.094 cm^3   Wound Healing % -- 95   Margins Well-defined edges Well-defined edges   Non-staged Wound Description Full thickness Full thickness   Jennifer-wound Assessment Moist;Maceration;Edema Moist;Maceration;Callous;Blanchable erythema   Wound Granulation Tissue Pink;Firm;Pale Grey Pink;Red;Spongy   Wound Bed Granulation (%) 20 % 50 %   Wound Bed Epithelium (%) 10 % --   Wound Bed Slough (%) 30 % 50 %   Wound Odor None None   Shape 40% Callous --   Tunneling? -- No   Undermining? -- No   Sinus Tracts? -- No   Guevara Scale Grade 2 Grade 2       Active Orders   Date Order Priority Status Authorizing Provider   05/14/25 1049 Debridement Diabetic Ulcer Left Routine Active Rojas Robert MD       Inactive Orders    Date Order Priority Status Authorizing Provider   04/23/25 0936 Debridement Diabetic Ulcer Left Routine Completed Rojas Robert MD                ASSESSMENT AND PLAN:     Assessment     Encounter Diagnosis  1. Ulcer of left foot, with fat layer exposed ([facility])    2. Diabetic ulcer of left midfoot associated with type 2 diabetes mellitus, with fat layer exposed ([facility])    3. Sloughing of wound    4. Delayed wound healing    5. Type 2 diabetes mellitus with hyperglycemia, with long-term current use of insulin ([facility])    6. Peripheral vascular disease, unspecified    7. Class 2 severe obesity due to excess calories with serious comorbidity and body mass index (BMI) of 36.0 to 36.9 in adult ([facility])    8. Stage 3 chronic kidney disease, unspecified whether stage 3a or 3b CKD ([facility])            PROCEDURES:      Debridement Diabetic Ulcer Left   Wound 03/24/25 #3 Left Great Toe Left     Performed by: Rojas Robert MD  Authorized by: Rojas Robert MD       Consent   Consent obtained? verbal  Consent given by: patient  Risks discussed? procedural risks discussed     Debridement Details  Performed by: physician  Debridement type: conservative sharp  Pain control: lidocaine 4%  Pain control administration type: topical     Pre-debridement measurements  Length (cm): 1.8  Width (cm): 1  Depth (cm): 0.1  Surface Area (cm^2): 1.41     Post-debridement measurements  Length (cm): 1.8  Width (cm): 1  Depth (cm): 0.1  Percent debrided: 100%  Surface Area (cm^2): 1.41  Area Debrided (cm^2): 1.41  Volume (cm^3): 0.09     Devitalized tissue debrided: biofilm, callus and slough  Instrument(s) utilized: forceps  Comment regarding bleeding: minimal  Hemostasis obtained with: pressure  Procedural pain (0-10): 0  Post-procedural pain: 0   Response to treatment: procedure was tolerated well             PLAN OF CARE:    Serial debridements to hasten healing.  Continue honey gel on the great toe ulcer  Intense offloading  needed  Discussed and counseled options for offloading in detail.  Consider getting an knee roller  Continue to use Darco offloading shoe for now.  Watch out for signs of early infection - counseled.   Plan of care discussed with patient in detail - All questions answered   Return in 2-3 week.     Orders  Orders Placed This Encounter   Procedures    Debridement Diabetic Ulcer Left       Patient Instructions     Return 2 - 3 weeks    Wound Cleaning and Dressings:    Wash your hands with soap and water. Always wear gloves while changing dressings. Donot touch wound / lizbeth-wound skin with un-gloved hands. Remove old dressing, discard and place into trash.      DRESSINGS:  honey gel / gauze- big toe ulcer.   Barrier cream periwound.   Band aid only on second toe  No dressings on previously wounded area of plantar mid foot.   Change dressing daily.  Wear spandagrip    Off-Loading:  Get FOREFOOT OFFLOADING ORTHOSES BOOT   Darco shoe with PEg insert- for now.     Miscellaneous Instructions:  Supplement with a daily multivitamin   Low salt diet  Intense blood sugar control - Goal Blood sugar below 180 at all times recommended.  Increase protein intake / consider protein supplements - see below  Elevate extremities at all times when sitting / laying down.    DIETARY MODIFICATIONS TO HELP WITH WOUND HEALING:    Protein: Meats, beans, eggs, milk and yogurt particularly Greek yogurt), tofu, soy nuts, soy protein products    Vitamin C: Citrus fruits and juices, strawberries, tomatoes, tomato juice, peppers, baked potatoes, spinach, broccoli, cauliflower, [place] sprouts, cabbage    Vitamin A: Dark green, leafy vegetables, orange or yellow vegetables, cantaloupe, fortified dairy products, liver, fortified cereals    Zinc: Fortified cereals, red meats, seafood    Consider Trent [employer] (These are essential branch chain amino acids that help with tissue building and wound healing) and take 2 packets/day. you can order  online at [employer] or [employer]    ADDITIONAL REMINDERS:    The treatment plan has been discussed at length with you and your provider. Follow all instructions carefully, it is very important. If you do not follow all instructions, you are at  risk of your wound not healing, infection, possible loss of limb and even end of life.  Please call the clinic during regular business hours ( 7:30 AM - 5:30 PM) if you notice increased bleeding, redness, warmth, pain or pus like drainage or start running a fever greater than 100.3.    For after hour emergencies, please call your primary physician or go to the nearest emergency room.    Patient/Caregiver Education: There are no barriers to learning. Medical education for above diagnosis given.   Answered all questions.    Outcome: Patient verbalizes understanding. Patient is notified to call with any questions, complications, allergies, or worsening or changing symptoms.  Patient is to call with any side effects or complications as a result of the treatments today.      DOCUMENTATION OF TIME SPENT: Code selection for this visit was based on time spent : 30 min on date of service in preparing to see the patient, obtaining and/or reviewing separately obtained history, performing a medically appropriate examination, counseling and educating the patient/family/caregiver, ordering medications or testing, referring and communicating with other healthcare providers, documenting clinical information in the E HR, independently interpreting results and communicating results to the patient/family/caregiver and care coordination with the patient's other providers.    Followup: Return in 3 weeks (on 6/4/2025) for Wound followup.      Note to Patient:  The 21st Century Cures Act makes medical notes like these available to patients in the interest of transparency. However, be advised this is a medical document and is intended as [id number] communication; it is written in medical language and  may appear blunt, direct, or contain abbreviations or verbiage that are unfamiliar. Medical documents are intended to carry relevant information, facts as evident, and the clinical opinion of the practitioner.    Also, please note that this report has been produced using speech recognition software and may contain errors related to that system including, but not limited to, errors in grammar, punctuation, and spelling, as well as words and phrases that possibly may have been recognized inappropriately.  If there are any questions or concerns, contact the dictating provider for clarification.      Rojas Short MD  5/14/2025  10:40 AM                      [1]   Allergies  Allergen Reactions    Topiramate HIVES and UNKNOWN   [2]   Current Outpatient Medications   Medication Sig Dispense Refill    gentamicin 0.1 % External Ointment Apply 1 Application topically 3 (three) times daily. 30 g 3    Tirzepatide (MOUNJARO) 12.5 MG/0.5ML Subcutaneous Solution Auto-injector Inject 15 1e11 Vector Genomes/m2 into the skin once a week.      metOLazone 2.5 MG Oral Tab Take 1 tablet (2.5 mg total) by mouth daily.      apixaban 5 MG Oral Tab Take 2 tabs (10mg) by mouth twice daily for 7 days, then take 1 tab (5mg) by mouth twice daily thereafter. (Patient not taking: Reported on 3/3/2025) 74 tablet 0    tiZANidine 2 MG Oral Tab Take 1 tablet (2 mg total) by mouth every 6 (six) hours as needed.      FARXIGA 10 MG Oral Tab Take 1 tablet (10 mg total) by mouth daily.      TRULICITY 1.5 MG/0.5ML Subcutaneous Solution Pen-injector Inject into the skin once a week. (Patient not taking: Reported on 3/3/2025)      gabapentin 100 MG Oral Cap       levothyroxine 125 MCG Oral Tab Pt unsure of dose      Phentermine HCl 37.5 MG Oral Tab Take 1 tablet (37.5 mg total) by mouth daily as needed.      rOPINIRole 0.25 MG Oral Tab Take 4 tablets (1 mg total) by mouth at bedtime.      silver sulfADIAZINE 1 % External Cream       SUMAtriptan  Succinate 100 MG Oral Tab TAKE 1 TABLET AS NEEDED ORALLY DAILY AS NEEDED 30      traZODone 50 MG Oral Tab 1 TABLET AT BEDTIME AS NEEDED ORALLY ONCE A DAY 90      allopurinol 300 MG Oral Tab Take 1 tablet (300 mg total) by mouth daily.      carvedilol 3.125 MG Oral Tab Pt unsure of dose      LANTUS SOLOSTAR 100 UNIT/ML Subcutaneous Solution Pen-injector       ergocalciferol 1.25 MG (38788 UT) Oral Cap Take 1 capsule (50,000 Units total) by mouth once a week. (Patient not taking: Reported on 3/3/2025)  1    Potassium Chloride ER 10 MEQ Oral Tab CR Take 1 tablet (10 mEq total) by mouth 2 (two) times daily.      torsemide 20 MG Oral Tab Take 1 tablet (20 mg total) by mouth daily. 30 tablet 0    insulin glargine 100 UNIT/ML Subcutaneous Solution Inject 50 Units into the skin 2 (two) times daily. 1 pen 3    buPROPion HCl ER, XL, 300 MG Oral Tablet 24 Hr Take 1 tablet (300 mg total) by mouth daily.      Pravastatin Sodium 80 MG Oral Tab Take 1 tablet (80 mg total) by mouth nightly.      spironolactone 25 MG Oral Tab Take 1 tablet (25 mg total) by mouth daily.      Insulin Lispro, Human, (HUMALOG) 100 UNIT/ML Subcutaneous Solution Inject 10-15 Units into the skin 3 (three) times daily before meals.      cetirizine (ZYRTEC) 10 MG Oral Tab Take 1 tablet (10 mg total) by mouth daily.

## 2025-05-14 NOTE — PROGRESS NOTES
Patient ID: Becky Mabry is a 69 year old female.    Debridement Diabetic Ulcer Left   Wound 03/24/25 #3 Left Great Toe Left    Performed by: Rojas Robert MD  Authorized by: Rojas Robert MD      Consent   Consent obtained? verbal  Consent given by: patient  Risks discussed? procedural risks discussed    Debridement Details  Performed by: physician  Debridement type: conservative sharp  Pain control: lidocaine 4%  Pain control administration type: topical    Pre-debridement measurements  Length (cm): 1.8  Width (cm): 1  Depth (cm): 0.1  Surface Area (cm^2): 1.41    Post-debridement measurements  Length (cm): 1.8  Width (cm): 1  Depth (cm): 0.1  Percent debrided: 100%  Surface Area (cm^2): 1.41  Area Debrided (cm^2): 1.41  Volume (cm^3): 0.09    Devitalized tissue debrided: biofilm, callus and slough  Instrument(s) utilized: forceps  Comment regarding bleeding: minimal  Hemostasis obtained with: pressure  Procedural pain (0-10): 0  Post-procedural pain: 0   Response to treatment: procedure was tolerated well

## 2025-05-14 NOTE — PATIENT INSTRUCTIONS
Return 2 - 3 weeks    Wound Cleaning and Dressings:    Wash your hands with soap and water. Always wear gloves while changing dressings. Donot touch wound / lizbeth-wound skin with un-gloved hands. Remove old dressing, discard and place into trash.      DRESSINGS:  honey gel / gauze- big toe ulcer.   Barrier cream periwound.   Band aid only on second toe  No dressings on previously wounded area of plantar mid foot.   Change dressing daily.  Wear spandagrip    Off-Loading:  Get FOREFOOT OFFLOADING ORTHOSES BOOT   Darco shoe with PEg insert- for now.     Miscellaneous Instructions:  Supplement with a daily multivitamin   Low salt diet  Intense blood sugar control - Goal Blood sugar below 180 at all times recommended.  Increase protein intake / consider protein supplements - see below  Elevate extremities at all times when sitting / laying down.    DIETARY MODIFICATIONS TO HELP WITH WOUND HEALING:    Protein: Meats, beans, eggs, milk and yogurt particularly Greek yogurt), tofu, soy nuts, soy protein products    Vitamin C: Citrus fruits and juices, strawberries, tomatoes, tomato juice, peppers, baked potatoes, spinach, broccoli, cauliflower, [place] sprouts, cabbage    Vitamin A: Dark green, leafy vegetables, orange or yellow vegetables, cantaloupe, fortified dairy products, liver, fortified cereals    Zinc: Fortified cereals, red meats, seafood    Consider Trent by [employer] (These are essential branch chain amino acids that help with tissue building and wound healing) and take 2 packets/day. you can order online at [employer] or [employer]    ADDITIONAL REMINDERS:    The treatment plan has been discussed at length with you and your provider. Follow all instructions carefully, it is very important. If you do not follow all instructions, you are at  risk of your wound not healing, infection, possible loss of limb and even end of life.  Please call the clinic during regular business hours ( 7:30 AM - 5:30 PM) if you notice  increased bleeding, redness, warmth, pain or pus like drainage or start running a fever greater than 100.3.    For after hour emergencies, please call your primary physician or go to the nearest emergency room.

## 2025-05-28 ENCOUNTER — APPOINTMENT (OUTPATIENT)
Dept: WOUND CARE | Facility: HOSPITAL | Age: 70
End: 2025-05-28
Attending: INTERNAL MEDICINE
Payer: MEDICARE

## 2025-06-18 ENCOUNTER — OFFICE VISIT (OUTPATIENT)
Dept: WOUND CARE | Facility: HOSPITAL | Age: 70
End: 2025-06-18
Attending: INTERNAL MEDICINE
Payer: MEDICARE

## 2025-06-18 VITALS
HEART RATE: 58 BPM | DIASTOLIC BLOOD PRESSURE: 62 MMHG | RESPIRATION RATE: 14 BRPM | TEMPERATURE: 98 F | SYSTOLIC BLOOD PRESSURE: 112 MMHG

## 2025-06-18 DIAGNOSIS — L97.522 ULCER OF LEFT FOOT, WITH FAT LAYER EXPOSED (HCC): Primary | ICD-10-CM

## 2025-06-18 DIAGNOSIS — E11.621 DIABETIC ULCER OF LEFT MIDFOOT ASSOCIATED WITH TYPE 2 DIABETES MELLITUS, WITH FAT LAYER EXPOSED (HCC): ICD-10-CM

## 2025-06-18 DIAGNOSIS — E66.812 CLASS 2 SEVERE OBESITY DUE TO EXCESS CALORIES WITH SERIOUS COMORBIDITY AND BODY MASS INDEX (BMI) OF 36.0 TO 36.9 IN ADULT (HCC): ICD-10-CM

## 2025-06-18 DIAGNOSIS — L08.9 INFECTED WOUND: ICD-10-CM

## 2025-06-18 DIAGNOSIS — E11.65 TYPE 2 DIABETES MELLITUS WITH HYPERGLYCEMIA, WITH LONG-TERM CURRENT USE OF INSULIN (HCC): ICD-10-CM

## 2025-06-18 DIAGNOSIS — N18.30 STAGE 3 CHRONIC KIDNEY DISEASE, UNSPECIFIED WHETHER STAGE 3A OR 3B CKD (HCC): ICD-10-CM

## 2025-06-18 DIAGNOSIS — T14.8XXD DELAYED WOUND HEALING: ICD-10-CM

## 2025-06-18 DIAGNOSIS — Z79.4 TYPE 2 DIABETES MELLITUS WITH HYPERGLYCEMIA, WITH LONG-TERM CURRENT USE OF INSULIN (HCC): ICD-10-CM

## 2025-06-18 DIAGNOSIS — I73.9 PERIPHERAL VASCULAR DISEASE, UNSPECIFIED: ICD-10-CM

## 2025-06-18 DIAGNOSIS — R23.8 SLOUGHING OF WOUND: ICD-10-CM

## 2025-06-18 DIAGNOSIS — E66.01 CLASS 2 SEVERE OBESITY DUE TO EXCESS CALORIES WITH SERIOUS COMORBIDITY AND BODY MASS INDEX (BMI) OF 36.0 TO 36.9 IN ADULT (HCC): ICD-10-CM

## 2025-06-18 DIAGNOSIS — L97.422 DIABETIC ULCER OF LEFT MIDFOOT ASSOCIATED WITH TYPE 2 DIABETES MELLITUS, WITH FAT LAYER EXPOSED (HCC): ICD-10-CM

## 2025-06-18 DIAGNOSIS — T14.8XXA INFECTED WOUND: ICD-10-CM

## 2025-06-18 PROCEDURE — 97597 DBRDMT OPN WND 1ST 20 CM/<: CPT | Performed by: INTERNAL MEDICINE

## 2025-06-18 NOTE — PROGRESS NOTES
[facility] PROGRESS NOTE  MINERVA MARTÍNEZ MD  6/18/2025    Chief Complaint:   Chief Complaint   Patient presents with    Wound Care     Follow up for left toe wounds. No complaints at this time. Pt arrives with dressings in place and wearing darco shoe.        HPI:   Subjective   Becky Mabry is a 69 year old female coming in for a follow-up visit.    HPI    Big toe Wound improved.  Dimensions smaller.   Tissue quality better but lot of dead  dried tissue present on wound bed.   No s/o infection.   Offloading as able  She is moving hence she has to be on a feet a lot for packing etc.     Review of Systems  Negative except HPI   Denies chest pain / SOB / palpitations  Denies fever.     Allergies  Allergies[1]    Current Meds:  Current Medications[2]      EXAM:   Objective   Objective    Physical Exam    Vital Signs  Vitals:    06/18/25 0700   BP: 112/62   Pulse: 58   Resp: 14   Temp: 97.6 °F (36.4 °C)       Wound Assessment  Wound 03/24/25 #2 Left 2nd Toe Toe (Comment which one) Left (Active)   Date First Assessed: 03/24/25    Wound Number (Wound Clinic Only): #2 Left 2nd Toe  Primary Wound Type: Diabetic Ulcer  Location: Toe (Comment which one)  Wound Location Orientation: Left      Assessments 3/24/2025  9:46 AM 6/18/2025 10:24 AM   Wound Image       Drainage Amount Moderate None   Drainage Description Serosanguineous --   Wound Length (cm) 2.6 cm 0 cm   Wound Width (cm) 1.5 cm 0 cm   Wound Surface Area (cm^2) 3.9 cm^2 0 cm^2   Wound Depth (cm) 0.1 cm 0 cm   Wound Volume (cm^3) 0.39 cm^3 0 cm^3   Wound Healing % -- 100   Margins Well-defined edges Flat and Intact   Non-staged Wound Description Full thickness --   Jennifer-wound Assessment Moist;Maceration;Dry;Edema;Callous Dry   Wound Granulation Tissue Pink;Firm;Pale Parson --   Wound Bed Granulation (%) 50 % --   Wound Bed Epithelium (%) 25 % 100 %   Wound Bed Slough (%) 25 % --   Wound Odor None None   Shape bridged --   Tunneling? -- No   Undermining? -- No    Sinus Tracts? -- No   Guevara Scale Grade 2 Grade 2       Inactive Orders   Date Order Priority Status Authorizing Provider   04/23/25 0935 Debridement Diabetic Ulcer Left Toe (Comment which one) Routine Completed Rojas Robert MD       Wound 03/24/25 #3 Left Great Toe Left (Active)   Date First Assessed/Time First Assessed: 03/24/25 0942    Wound Number (Wound Clinic Only): #3 Left Great Toe  Primary Wound Type: Diabetic Ulcer  Wound Location Orientation: Left      Assessments 3/24/2025  9:48 AM 6/18/2025 10:23 AM   Wound Image         Drainage Amount Small None   Drainage Description Serosanguineous --   Wound Length (cm) 4.7 cm 1.5 cm   Wound Width (cm) 3.8 cm 1 cm   Wound Surface Area (cm^2) 17.86 cm^2 1.18 cm^2   Wound Depth (cm) 0.1 cm 0.1 cm   Wound Volume (cm^3) 1.786 cm^3 0.079 cm^3   Wound Healing % -- 96   Margins Well-defined edges Well-defined edges   Non-staged Wound Description Full thickness Full thickness   Jennifer-wound Assessment Moist;Maceration;Edema Callous;Dry   Wound Granulation Tissue Pink;Firm;Pale Parson --   Wound Bed Granulation (%) 20 % --   Wound Bed Epithelium (%) 10 % --   Wound Bed Slough (%) 30 % --   Wound Odor None None   Shape 40% Callous 100% scabbed   Guevara Scale Grade 2 Grade 2       Active Orders   Date Order Priority Status Authorizing Provider   06/18/25 1055 Debridement Diabetic Ulcer Left Routine Active Rojas Robert MD       Inactive Orders   Date Order Priority Status Authorizing Provider   05/14/25 1049 Debridement Diabetic Ulcer Left Routine Completed Rojas Robert MD   04/23/25 0936 Debridement Diabetic Ulcer Left Routine Completed Rojas Robert MD                ASSESSMENT AND PLAN:     Assessment     Encounter Diagnosis  1. Ulcer of left foot, with fat layer exposed (HCC)    2. Diabetic ulcer of left midfoot associated with type 2 diabetes mellitus, with fat layer exposed (HCC)    3. Sloughing of wound    4. Delayed wound healing    5.  Type 2 diabetes mellitus with hyperglycemia, with long-term current use of insulin ([facility])    6. Peripheral vascular disease, unspecified    7. Class 2 severe obesity due to excess calories with serious comorbidity and body mass index (BMI) of 36.0 to 36.9 in adult ([facility])    8. Stage 3 chronic kidney disease, unspecified whether stage 3a or 3b CKD ([facility])    9. Infected wound        PROCEDURES:    Debridement Diabetic Ulcer Left   Wound 03/24/25 #3 Left Great Toe Left     Performed by: Rojas Robert MD  Authorized by: Rojas Robert MD       Consent   Consent obtained? verbal  Consent given by: patient  Risks discussed? procedural risks discussed     Debridement Details  Performed by: physician  Debridement type: conservative sharp  Pain control: lidocaine 4%  Pain control administration type: topical     Pre-debridement measurements  Length (cm): 1.5  Width (cm): 1  Depth (cm): 0.1  Surface Area (cm^2): 1.18     Post-debridement measurements  Length (cm): 1.5  Width (cm): 1  Depth (cm): 0.1  Percent debrided: 100%  Surface Area (cm^2): 1.18  Area Debrided (cm^2): 1.18  Volume (cm^3): 0.08     Devitalized tissue debrided: biofilm, callus, exudate and slough  Instrument(s) utilized: blade and forceps  Comment regarding bleeding: minimal  Hemostasis obtained with: pressure  Procedural pain (0-10): 0  Post-procedural pain: 0   Response to treatment: procedure was tolerated well        PLAN OF CARE:    Serial debridements to hasten healing  Continue honey gel  Intense offloading discussed  Watch out for signs of early infection - counseled.   Plan of care discussed with patient in detail - All questions answered   Return in 2-3 week.     Orders  Orders Placed This Encounter   Procedures    Debridement Diabetic Ulcer Left         Patient Instructions     Return 2 - 3 weeks    Wound Cleaning and Dressings:    Wash your hands with soap and water. Always wear gloves while changing dressings. Donot touch wound /  lizbeth-wound skin with un-gloved hands. Remove old dressing, discard and place into trash.      DRESSINGS:  honey gel / gauze- big toe ulcer.   Barrier cream periwound.   Change dressing daily.  Wear spandagrip    Off-Loading:  Get FOREFOOT OFFLOADING ORTHOSES BOOT   Darco shoe with PEg insert- for now.     Miscellaneous Instructions:  Supplement with a daily multivitamin   Low salt diet  Intense blood sugar control - Goal Blood sugar below 180 at all times recommended.  Increase protein intake / consider protein supplements - see below  Elevate extremities at all times when sitting / laying down.    DIETARY MODIFICATIONS TO HELP WITH WOUND HEALING:    Protein: Meats, beans, eggs, milk and yogurt particularly Greek yogurt), tofu, soy nuts, soy protein products    Vitamin C: Citrus fruits and juices, strawberries, tomatoes, tomato juice, peppers, baked potatoes, spinach, broccoli, cauliflower, [place] sprouts, cabbage    Vitamin A: Dark green, leafy vegetables, orange or yellow vegetables, cantaloupe, fortified dairy products, liver, fortified cereals    Zinc: Fortified cereals, red meats, seafood    Consider Trent by [employer] (These are essential branch chain amino acids that help with tissue building and wound healing) and take 2 packets/day. you can order online at [email or web address] or [email or web address]    ADDITIONAL REMINDERS:    The treatment plan has been discussed at length with you and your provider. Follow all instructions carefully, it is very important. If you do not follow all instructions, you are at  risk of your wound not healing, infection, possible loss of limb and even end of life.  Please call the clinic during regular business hours ( 7:30 AM - 5:30 PM) if you notice increased bleeding, redness, warmth, pain or pus like drainage or start running a fever greater than 100.3.    For after hour emergencies, please call your primary physician or go to the nearest emergency room.      Patient/Caregiver Education: There are  no barriers to learning. Medical education for above diagnosis given.   Answered all questions.    Outcome: Patient verbalizes understanding. Patient is notified to call with any questions, complications, allergies, or worsening or changing symptoms.  Patient is to call with any side effects or complications as a result of the treatments today.      DOCUMENTATION OF TIME SPENT: Code selection for this visit was based on time spent : 31 min on date of service in preparing to see the patient, obtaining and/or reviewing separately obtained history, performing a medically appropriate examination, counseling and educating the patient/family/caregiver, ordering medications or testing, referring and communicating with other healthcare providers, documenting clinical information in the E HR, independently interpreting results and communicating results to the patient/family/caregiver and care coordination with the patient's other providers.    Followup: Return in about 2 weeks (around 7/2/2025) for Wound followup.      Note to Patient:  The 21st Century Cures Act makes medical notes like these available to patients in the interest of transparency. However, be advised this is a medical document and is intended as [id number] communication; it is written in medical language and may appear blunt, direct, or contain abbreviations or verbiage that are unfamiliar. Medical documents are intended to carry relevant information, facts as evident, and the clinical opinion of the practitioner.    Also, please note that this report has been produced using speech recognition software and may contain errors related to that system including, but not limited to, errors in grammar, punctuation, and spelling, as well as words and phrases that possibly may have been recognized inappropriately.  If there are any questions or concerns, contact the dictating provider for clarification.      Rojas Short MD  6/18/2025  11:06 AM                       [1]   Allergies  Allergen Reactions    Topiramate HIVES and UNKNOWN   [2]   Current Outpatient Medications   Medication Sig Dispense Refill    gentamicin 0.1 % External Ointment Apply 1 Application topically 3 (three) times daily. 30 g 3    Tirzepatide (MOUNJARO) 12.5 MG/0.5ML Subcutaneous Solution Auto-injector Inject 15 1e11 Vector Genomes/m2 into the skin once a week.      metOLazone 2.5 MG Oral Tab Take 1 tablet (2.5 mg total) by mouth daily.      apixaban 5 MG Oral Tab Take 2 tabs (10mg) by mouth twice daily for 7 days, then take 1 tab (5mg) by mouth twice daily thereafter. (Patient not taking: Reported on 3/3/2025) 74 tablet 0    tiZANidine 2 MG Oral Tab Take 1 tablet (2 mg total) by mouth every 6 (six) hours as needed.      FARXIGA 10 MG Oral Tab Take 1 tablet (10 mg total) by mouth daily.      TRULICITY 1.5 MG/0.5ML Subcutaneous Solution Pen-injector Inject into the skin once a week. (Patient not taking: Reported on 3/3/2025)      gabapentin 100 MG Oral Cap       levothyroxine 125 MCG Oral Tab Pt unsure of dose      Phentermine HCl 37.5 MG Oral Tab Take 1 tablet (37.5 mg total) by mouth daily as needed.      rOPINIRole 0.25 MG Oral Tab Take 4 tablets (1 mg total) by mouth at bedtime.      silver sulfADIAZINE 1 % External Cream       SUMAtriptan Succinate 100 MG Oral Tab TAKE 1 TABLET AS NEEDED ORALLY DAILY AS NEEDED 30      traZODone 50 MG Oral Tab 1 TABLET AT BEDTIME AS NEEDED ORALLY ONCE A DAY 90      allopurinol 300 MG Oral Tab Take 1 tablet (300 mg total) by mouth daily.      carvedilol 3.125 MG Oral Tab Pt unsure of dose      LANTUS SOLOSTAR 100 UNIT/ML Subcutaneous Solution Pen-injector       ergocalciferol 1.25 MG (27475 UT) Oral Cap Take 1 capsule (50,000 Units total) by mouth once a week. (Patient not taking: Reported on 3/3/2025)  1    Potassium Chloride ER 10 MEQ Oral Tab CR Take 1 tablet (10 mEq total) by mouth 2 (two) times daily.      torsemide 20 MG Oral Tab Take 1 tablet (20 mg  total) by mouth daily. 30 tablet 0    insulin glargine 100 UNIT/ML Subcutaneous Solution Inject 50 Units into the skin 2 (two) times daily. 1 pen 3    buPROPion HCl ER, XL, 300 MG Oral Tablet 24 Hr Take 1 tablet (300 mg total) by mouth daily.      Pravastatin Sodium 80 MG Oral Tab Take 1 tablet (80 mg total) by mouth nightly.      spironolactone 25 MG Oral Tab Take 1 tablet (25 mg total) by mouth daily.      Insulin Lispro, Human, (HUMALOG) 100 UNIT/ML Subcutaneous Solution Inject 10-15 Units into the skin 3 (three) times daily before meals.      cetirizine (ZYRTEC) 10 MG Oral Tab Take 1 tablet (10 mg total) by mouth daily.

## 2025-06-18 NOTE — PROGRESS NOTES
Patient ID: Becky Mabry is a 69 year old female.    Debridement Diabetic Ulcer Left   Wound 03/24/25 #3 Left Great Toe Left    Performed by: Rojas Robert MD  Authorized by: Rojas Robert MD      Consent   Consent obtained? verbal  Consent given by: patient  Risks discussed? procedural risks discussed    Debridement Details  Performed by: physician  Debridement type: conservative sharp  Pain control: lidocaine 4%  Pain control administration type: topical    Pre-debridement measurements  Length (cm): 1.5  Width (cm): 1  Depth (cm): 0.1  Surface Area (cm^2): 1.18    Post-debridement measurements  Length (cm): 1.5  Width (cm): 1  Depth (cm): 0.1  Percent debrided: 100%  Surface Area (cm^2): 1.18  Area Debrided (cm^2): 1.18  Volume (cm^3): 0.08    Devitalized tissue debrided: biofilm, callus, exudate and slough  Instrument(s) utilized: blade and forceps  Comment regarding bleeding: minimal  Hemostasis obtained with: pressure  Procedural pain (0-10): 0  Post-procedural pain: 0   Response to treatment: procedure was tolerated well

## 2025-06-18 NOTE — PATIENT INSTRUCTIONS
Return 2 - 3 weeks    Wound Cleaning and Dressings:    Wash your hands with soap and water. Always wear gloves while changing dressings. Donot touch wound / lizbeth-wound skin with un-gloved hands. Remove old dressing, discard and place into trash.      DRESSINGS:  honey gel / gauze- big toe ulcer.   Barrier cream periwound.   Change dressing daily.  Wear spandagrip    Off-Loading:  Get FOREFOOT OFFLOADING ORTHOSES BOOT   Darco shoe with PEg insert- for now.     Miscellaneous Instructions:  Supplement with a daily multivitamin   Low salt diet  Intense blood sugar control - Goal Blood sugar below 180 at all times recommended.  Increase protein intake / consider protein supplements - see below  Elevate extremities at all times when sitting / laying down.    DIETARY MODIFICATIONS TO HELP WITH WOUND HEALING:    Protein: Meats, beans, eggs, milk and yogurt particularly Greek yogurt), tofu, soy nuts, soy protein products    Vitamin C: Citrus fruits and juices, strawberries, tomatoes, tomato juice, peppers, baked potatoes, spinach, broccoli, cauliflower, [place] sprouts, cabbage    Vitamin A: Dark green, leafy vegetables, orange or yellow vegetables, cantaloupe, fortified dairy products, liver, fortified cereals    Zinc: Fortified cereals, red meats, seafood    Consider Trent by [email or web address] (These are essential branch chain amino acids that help with tissue building and wound healing) and take 2 packets/day. you can order online at [email or web address] or [email or web address]    ADDITIONAL REMINDERS:    The treatment plan has been discussed at length with you and your provider. Follow all instructions carefully, it is very important. If you do not follow all instructions, you are at  risk of your wound not healing, infection, possible loss of limb and even end of life.  Please call the clinic during regular business hours ( 7:30 AM - 5:30 PM) if you notice increased bleeding, redness, warmth, pain or pus like drainage or start running a fever greater  than 100.3.    For after hour emergencies, please call your primary physician or go to the nearest emergency room.

## 2025-06-18 NOTE — PROGRESS NOTES
Weekly Wound Education Note    Teaching Provided To: Patient  Training Topics: Cleasing and general instructions, Discharge instructions, Dressing, Off-loading  Training Method: Explain/Verbal  Training Response: Patient responds and understands        Notes: Much improved. Per provider wound to left 2nd toe is healed. Left great toe: honey gel and bordered gauze. Continue to offload area as best you can.

## 2025-06-30 ENCOUNTER — APPOINTMENT (OUTPATIENT)
Dept: WOUND CARE | Facility: HOSPITAL | Age: 70
End: 2025-06-30
Attending: INTERNAL MEDICINE
Payer: MEDICARE

## (undated) NOTE — LETTER
Date: 4/30/2025  Patient name: Harmony Mabry  YOB: 1955  Medical Record Number: [id number]  Primary Coverage: Payor: MEDICARE / Plan: MEDICARE PART A&B / Product Type: *No Product type* /   Secondary Coverage: AETNA INS - AETNA MEDICARE SUPPLEMENT  Insurance ID: [id number]  Patient Address: [address] [place] [address]  Telephone Information:   Home Phone [phone]   Work Phone [phone]   Mobile [phone]         Encounter Date: 4/30/2025  Provider: Minerva Martínez MD  Diagnosis:     ICD-10-CM   1. Ulcer of left foot, with fat layer exposed ([facility])  L97.522   2. Diabetic ulcer of left midfoot associated with type 2 diabetes mellitus, with fat layer exposed ([facility])  E11.621    L97.422   3. Sloughing of wound  R23.8   4. Delayed wound healing  T14.8XXD   5. Type 2 diabetes mellitus with hyperglycemia, with long-term current use of insulin ([facility])  E11.65    Z79.4   6. Peripheral vascular disease, unspecified  I73.9       Progress Note:  Weekly Wound Education Note    Teaching Provided To: Patient  Training Topics: Cleasing and general instructions, Discharge instructions, Dressing, Off-loading  Training Method: Explain/Verbal  Training Response: Patient responds and understands        Notes: Improving. Left great toe: honey gel and bordered gauze. Left 2nd toe: triad paste and bandaid. Continue using dacro shoe to offload. Ordering supplies from [employer]. Pt concerned with plantar foot - no issues noted. Advised to make appointment with podiatrist for callous pairing and routine maintenance as she is diabetic.         [facility] PROGRESS NOTE  MINERVA MARTÍNEZ MD  4/30/2025    Chief Complaint:   Chief Complaint   Patient presents with    Wound Care     Follow up for left toe wounds. No complaints at this time.        HPI:   Subjective  Becky Mabry is a 69 year old female coming in for a follow-up visit.    HPI    Wound improved.   Dimensions and tissue quality  better  Responding well to honey gel.   No s/o infection.     Has some callus on plantar foot she requested to be pared.   But  I think it is too thin to be pared.   Need to see podiatry regularly.     Review of Systems  Negative except HPI   Denies chest pain / SOB / palpitations  Denies fever.     Allergies  Allergies[1]    Current Meds:  Current Medications[2]      EXAM:   Objective  Objective    Physical Exam    Vital Signs  Vitals:    04/30/25 1100   BP: 127/63   Pulse: 71   Resp: 16   Temp: 98 °F (36.7 °C)       Wound Assessment  Wound 03/24/25 #2 Left 2nd Toe Toe (Comment which one) Left (Active)   Date First Assessed: 03/24/25    Wound Number (Wound Clinic Only): #2 Left 2nd Toe  Primary Wound Type: Diabetic Ulcer  Location: Toe (Comment which one)  Wound Location Orientation: Left      Assessments 3/24/2025  9:46 AM 4/30/2025 11:18 AM   Wound Image       Drainage Amount Moderate Small   Drainage Description Serosanguineous Serosanguineous   Wound Length (cm) 2.6 cm 0.1 cm   Wound Width (cm) 1.5 cm 0.1 cm   Wound Surface Area (cm^2) 3.9 cm^2 0.01 cm^2   Wound Depth (cm) 0.1 cm 0.1 cm   Wound Volume (cm^3) 0.39 cm^3 0.001 cm^3   Wound Healing % -- 100   Margins Well-defined edges Well-defined edges   Non-staged Wound Description Full thickness Full thickness   Jennifer-wound Assessment Moist;Maceration;Dry;Edema;Callous Dry   Wound Granulation Tissue Pink;Firm;Pale Grey Pink;Firm   Wound Bed Granulation (%) 50 % 100 %   Wound Bed Epithelium (%) 25 % --   Wound Bed Slough (%) 25 % --   Wound Odor None None   Shape bridged --   Tunneling? -- No   Undermining? -- No   Sinus Tracts? -- No   Guevara Scale Grade 2 Grade 2       Inactive Orders   Date Order Priority Status Authorizing Provider   04/23/25 0935 Debridement Diabetic Ulcer Left Toe (Comment which one) Routine Completed Rojas Robert MD       Wound 03/24/25 #3 Left Great Toe Left (Active)   Date First Assessed/Time First Assessed: 03/24/25 0948     Wound Number (Wound Clinic Only): #3 Left Great Toe  Primary Wound Type: Diabetic Ulcer  Wound Location Orientation: Left      Assessments 3/24/2025  9:48 AM 4/30/2025 11:18 AM   Wound Image        Drainage Amount Small Large   Drainage Description Serosanguineous Serosanguineous   Wound Length (cm) 4.7 cm 4 cm   Wound Width (cm) 3.8 cm 1 cm   Wound Surface Area (cm^2) 17.86 cm^2 3.14 cm^2   Wound Depth (cm) 0.1 cm 0.1 cm   Wound Volume (cm^3) 1.786 cm^3 0.209 cm^3   Wound Healing % -- 88   Margins Well-defined edges Well-defined edges   Non-staged Wound Description Full thickness Full thickness   Jennifer-wound Assessment Moist;Maceration;Edema Edema;Moist;Maceration   Wound Granulation Tissue Pink;Firm;Pale Parson Pink;Spongy   Wound Bed Granulation (%) 20 % 50 %   Wound Bed Epithelium (%) 10 % 20 %   Wound Bed Slough (%) 30 % 30 %   Wound Odor None None   Shape 40% Callous Hypergranular, bridged   Tunneling? -- No   Undermining? -- No   Sinus Tracts? -- No   Guevara Scale Grade 2 Grade 2       Inactive Orders   Date Order Priority Status Authorizing Provider   04/23/25 0936 Debridement Diabetic Ulcer Left Routine Completed Rojas Robert MD                ASSESSMENT AND PLAN:     Assessment    Encounter Diagnosis  1. Ulcer of left foot, with fat layer exposed ([facility])    2. Diabetic ulcer of left midfoot associated with type 2 diabetes mellitus, with fat layer exposed ([facility])    3. Sloughing of wound    4. Delayed wound healing    5. Type 2 diabetes mellitus with hyperglycemia, with long-term current use of insulin ([facility])    6. Peripheral vascular disease, unspecified        PLAN OF CARE:    Left great toe: honey gel and bordered gauze.   Left 2nd toe: triad paste and bandaid.   Continue using dacro shoe to offload.   Ordering supplies from [facility].   Pt concerned with plantar foot - no issues noted. Advised to make appointment with podiatrist for callous pairing and routine maintenance as she is diabetic.   Watch out for  signs of early infection - counseled.   Plan of care discussed with patient in detail - All questions answered   Return in 2 week.     Patient Instructions     Return 2 week    Wound Cleaning and Dressings:    Wash your hands with soap and water. Always wear gloves while changing dressings. Donot touch wound / lizbeth-wound skin with un-gloved hands. Remove old dressing, discard and place into trash.      DRESSINGS:  honey gel / gauze- big toe ulcer.   Barrier cream periwound.   Barrier cream only on second toe  No dressings on previously wounded area of plantar mid foot.   Change dressing daily.  Wear spandagrip    Off-Loading:  Get FOREFOOT OFFLOADING ORTHOSES BOOT   Darco shoe with PEg insert- for now.     Miscellaneous Instructions:  Supplement with a daily multivitamin   Low salt diet  Intense blood sugar control - Goal Blood sugar below 180 at all times recommended.  Increase protein intake / consider protein supplements - see below  Elevate extremities at all times when sitting / laying down.    DIETARY MODIFICATIONS TO HELP WITH WOUND HEALING:    Protein: Meats, beans, eggs, milk and yogurt particularly Greek yogurt), tofu, soy nuts, soy protein products    Vitamin C: Citrus fruits and juices, strawberries, tomatoes, tomato juice, peppers, baked potatoes, spinach, broccoli, cauliflower, [place] sprouts, cabbage    Vitamin A: Dark green, leafy vegetables, orange or yellow vegetables, cantaloupe, fortified dairy products, liver, fortified cereals    Zinc: Fortified cereals, red meats, seafood    Consider Trent by [employer] (These are essential branch chain amino acids that help with tissue building and wound healing) and take 2 packets/day. you can order online at [employer] or [employer]    ADDITIONAL REMINDERS:    The treatment plan has been discussed at length with you and your provider. Follow all instructions carefully, it is very important. If you do not follow all instructions, you are at  risk of your wound  not healing, infection, possible loss of limb and even end of life.  Please call the clinic during regular business hours ( 7:30 AM - 5:30 PM) if you notice increased bleeding, redness, warmth, pain or pus like drainage or start running a fever greater than 100.3.    For after hour emergencies, please call your primary physician or go to the nearest emergency room.      Patient/Caregiver Education: There are no barriers to learning. Medical education for above diagnosis given.   Answered all questions.    Outcome: Patient verbalizes understanding. Patient is notified to call with any questions, complications, allergies, or worsening or changing symptoms.  Patient is to call with any side effects or complications as a result of the treatments today.      DOCUMENTATION OF TIME SPENT: Code selection for this visit was based on time spent : 25 min on date of service in preparing to see the patient, obtaining and/or reviewing separately obtained history, performing a medically appropriate examination, counseling and educating the patient/family/caregiver, ordering medications or testing, referring and communicating with other healthcare providers, documenting clinical information in the E HR, independently interpreting results and communicating results to the patient/family/caregiver and care coordination with the patient's other providers.    Followup: Return in about 2 weeks (around 5/14/2025) for Wound followup.      Note to Patient:  The 21st Century Cures Act makes medical notes like these available to patients in the interest of transparency. However, be advised this is a medical document and is intended as felv-lw-clhh communication; it is written in medical language and may appear blunt, direct, or contain abbreviations or verbiage that are unfamiliar. Medical documents are intended to carry relevant information, facts as evident, and the clinical opinion of the practitioner.    Also, please note that this report  has been produced using speech recognition software and may contain errors related to that system including, but not limited to, errors in grammar, punctuation, and spelling, as well as words and phrases that possibly may have been recognized inappropriately.  If there are any questions or concerns, contact the dictating provider for clarification.      Rojas Short MD  4/30/2025  1:58 PM                      [1] No Known Allergies  [2]   Current Outpatient Medications   Medication Sig Dispense Refill    gentamicin 0.1 % External Ointment Apply 1 Application topically 3 (three) times daily. 30 g 3    Tirzepatide (MOUNJARO) 12.5 MG/0.5ML Subcutaneous Solution Auto-injector Inject 15 1e11 Vector Genomes/m2 into the skin once a week.      metOLazone 2.5 MG Oral Tab Take 1 tablet (2.5 mg total) by mouth daily.      apixaban 5 MG Oral Tab Take 2 tabs (10mg) by mouth twice daily for 7 days, then take 1 tab (5mg) by mouth twice daily thereafter. (Patient not taking: Reported on 3/3/2025) 74 tablet 0    tiZANidine 2 MG Oral Tab Take 1 tablet (2 mg total) by mouth every 6 (six) hours as needed.      FARXIGA 10 MG Oral Tab Take 1 tablet (10 mg total) by mouth daily.      TRULICITY 1.5 MG/0.5ML Subcutaneous Solution Pen-injector Inject into the skin once a week. (Patient not taking: Reported on 3/3/2025)      gabapentin 100 MG Oral Cap       levothyroxine 125 MCG Oral Tab Pt unsure of dose      Phentermine HCl 37.5 MG Oral Tab Take 1 tablet (37.5 mg total) by mouth daily as needed.      rOPINIRole 0.25 MG Oral Tab Take 4 tablets (1 mg total) by mouth at bedtime.      silver sulfADIAZINE 1 % External Cream       SUMAtriptan Succinate 100 MG Oral Tab TAKE 1 TABLET AS NEEDED ORALLY DAILY AS NEEDED 30      traZODone 50 MG Oral Tab 1 TABLET AT BEDTIME AS NEEDED ORALLY ONCE A DAY 90      allopurinol 300 MG Oral Tab Take 1 tablet (300 mg total) by mouth daily.      carvedilol 3.125 MG Oral Tab Pt unsure of dose      LANTUS  SOLOSTAR 100 UNIT/ML Subcutaneous Solution Pen-injector       ergocalciferol 1.25 MG (49706 UT) Oral Cap Take 1 capsule (50,000 Units total) by mouth once a week. (Patient not taking: Reported on 3/3/2025)  1    Potassium Chloride ER 10 MEQ Oral Tab CR Take 1 tablet (10 mEq total) by mouth 2 (two) times daily.      torsemide 20 MG Oral Tab Take 1 tablet (20 mg total) by mouth daily. 30 tablet 0    insulin glargine 100 UNIT/ML Subcutaneous Solution Inject 50 Units into the skin 2 (two) times daily. 1 pen 3    buPROPion HCl ER, XL, 300 MG Oral Tablet 24 Hr Take 1 tablet (300 mg total) by mouth daily.      Pravastatin Sodium 80 MG Oral Tab Take 1 tablet (80 mg total) by mouth nightly.      spironolactone 25 MG Oral Tab Take 1 tablet (25 mg total) by mouth daily.      Insulin Lispro, Human, (HUMALOG) 100 UNIT/ML Subcutaneous Solution Inject 10-15 Units into the skin 3 (three) times daily before meals.      cetirizine (ZYRTEC) 10 MG Oral Tab Take 1 tablet (10 mg total) by mouth daily.               Wound Information/Order:  Wound Number: 2  Product: Bordered gauze 2x2  Dispense: 30 days  Dressing Frequency:Change dressing twice daily    Was a Debridement performed: Yes, Debridement type: mechanical    Compression Stockings ordered: No    Notes: Dispense as written. Please call the patient if there is any out of pocket payment prior to shipping out supplies. Thank you.